# Patient Record
Sex: FEMALE | Race: WHITE | HISPANIC OR LATINO | Employment: PART TIME | URBAN - METROPOLITAN AREA
[De-identification: names, ages, dates, MRNs, and addresses within clinical notes are randomized per-mention and may not be internally consistent; named-entity substitution may affect disease eponyms.]

---

## 2024-08-12 ENCOUNTER — APPOINTMENT (OUTPATIENT)
Dept: GENERAL RADIOLOGY | Facility: CLINIC | Age: 64
DRG: 512 | End: 2024-08-12
Attending: EMERGENCY MEDICINE
Payer: MEDICARE

## 2024-08-12 ENCOUNTER — HOSPITAL ENCOUNTER (INPATIENT)
Facility: CLINIC | Age: 64
LOS: 2 days | Discharge: HOME | DRG: 512 | End: 2024-08-14
Attending: EMERGENCY MEDICINE | Admitting: INTERNAL MEDICINE
Payer: MEDICARE

## 2024-08-12 DIAGNOSIS — S52.531A CLOSED COLLES' FRACTURE OF RIGHT RADIUS, INITIAL ENCOUNTER: ICD-10-CM

## 2024-08-12 LAB
ANION GAP SERPL CALCULATED.3IONS-SCNC: 10 MMOL/L (ref 7–15)
BASOPHILS # BLD AUTO: 0 10E3/UL (ref 0–0.2)
BASOPHILS NFR BLD AUTO: 1 %
BUN SERPL-MCNC: 13.6 MG/DL (ref 8–23)
CALCIUM SERPL-MCNC: 9.1 MG/DL (ref 8.8–10.4)
CHLORIDE SERPL-SCNC: 104 MMOL/L (ref 98–107)
CREAT SERPL-MCNC: 0.7 MG/DL (ref 0.51–0.95)
EGFRCR SERPLBLD CKD-EPI 2021: >90 ML/MIN/1.73M2
EOSINOPHIL # BLD AUTO: 0.1 10E3/UL (ref 0–0.7)
EOSINOPHIL NFR BLD AUTO: 2 %
ERYTHROCYTE [DISTWIDTH] IN BLOOD BY AUTOMATED COUNT: 13.2 % (ref 10–15)
GLUCOSE SERPL-MCNC: 98 MG/DL (ref 70–99)
HCO3 SERPL-SCNC: 25 MMOL/L (ref 22–29)
HCT VFR BLD AUTO: 40.1 % (ref 35–47)
HGB BLD-MCNC: 12.3 G/DL (ref 11.7–15.7)
HOLD SPECIMEN: NORMAL
IMM GRANULOCYTES # BLD: 0.1 10E3/UL
IMM GRANULOCYTES NFR BLD: 1 %
LYMPHOCYTES # BLD AUTO: 1.6 10E3/UL (ref 0.8–5.3)
LYMPHOCYTES NFR BLD AUTO: 27 %
MCH RBC QN AUTO: 26.4 PG (ref 26.5–33)
MCHC RBC AUTO-ENTMCNC: 30.7 G/DL (ref 31.5–36.5)
MCV RBC AUTO: 86 FL (ref 78–100)
MONOCYTES # BLD AUTO: 0.5 10E3/UL (ref 0–1.3)
MONOCYTES NFR BLD AUTO: 8 %
NEUTROPHILS # BLD AUTO: 3.7 10E3/UL (ref 1.6–8.3)
NEUTROPHILS NFR BLD AUTO: 62 %
NRBC # BLD AUTO: 0 10E3/UL
NRBC BLD AUTO-RTO: 0 /100
PLATELET # BLD AUTO: 275 10E3/UL (ref 150–450)
POTASSIUM SERPL-SCNC: 3.7 MMOL/L (ref 3.4–5.3)
RBC # BLD AUTO: 4.66 10E6/UL (ref 3.8–5.2)
SODIUM SERPL-SCNC: 139 MMOL/L (ref 135–145)
WBC # BLD AUTO: 6 10E3/UL (ref 4–11)

## 2024-08-12 PROCEDURE — 250N000009 HC RX 250: Performed by: EMERGENCY MEDICINE

## 2024-08-12 PROCEDURE — 2W3CX1Z IMMOBILIZATION OF RIGHT LOWER ARM USING SPLINT: ICD-10-PCS | Performed by: EMERGENCY MEDICINE

## 2024-08-12 PROCEDURE — 999N000179 XR SURGERY CARM FLUORO LESS THAN 5 MIN W STILLS: Mod: TC

## 2024-08-12 PROCEDURE — 250N000013 HC RX MED GY IP 250 OP 250 PS 637: Performed by: EMERGENCY MEDICINE

## 2024-08-12 PROCEDURE — 73130 X-RAY EXAM OF HAND: CPT | Mod: RT

## 2024-08-12 PROCEDURE — 120N000001 HC R&B MED SURG/OB

## 2024-08-12 PROCEDURE — 82306 VITAMIN D 25 HYDROXY: CPT | Performed by: PHYSICIAN ASSISTANT

## 2024-08-12 PROCEDURE — 85025 COMPLETE CBC W/AUTO DIFF WBC: CPT | Performed by: EMERGENCY MEDICINE

## 2024-08-12 PROCEDURE — 25600 CLTX DST RDL FX/EPHYS SEP WO: CPT | Mod: RT

## 2024-08-12 PROCEDURE — 999N000065 XR WRIST RIGHT G/E 3 VIEWS: Mod: RT

## 2024-08-12 PROCEDURE — 250N000013 HC RX MED GY IP 250 OP 250 PS 637: Performed by: PHYSICIAN ASSISTANT

## 2024-08-12 PROCEDURE — 99222 1ST HOSP IP/OBS MODERATE 55: CPT | Mod: AI | Performed by: PHYSICIAN ASSISTANT

## 2024-08-12 PROCEDURE — 82565 ASSAY OF CREATININE: CPT | Performed by: EMERGENCY MEDICINE

## 2024-08-12 PROCEDURE — 99152 MOD SED SAME PHYS/QHP 5/>YRS: CPT

## 2024-08-12 PROCEDURE — 99285 EMERGENCY DEPT VISIT HI MDM: CPT | Mod: 25

## 2024-08-12 PROCEDURE — 36415 COLL VENOUS BLD VENIPUNCTURE: CPT | Performed by: EMERGENCY MEDICINE

## 2024-08-12 PROCEDURE — 96374 THER/PROPH/DIAG INJ IV PUSH: CPT

## 2024-08-12 PROCEDURE — 73110 X-RAY EXAM OF WRIST: CPT | Mod: RT

## 2024-08-12 PROCEDURE — 96375 TX/PRO/DX INJ NEW DRUG ADDON: CPT

## 2024-08-12 PROCEDURE — 250N000011 HC RX IP 250 OP 636: Performed by: EMERGENCY MEDICINE

## 2024-08-12 PROCEDURE — 0PSHXZZ REPOSITION RIGHT RADIUS, EXTERNAL APPROACH: ICD-10-PCS | Performed by: EMERGENCY MEDICINE

## 2024-08-12 PROCEDURE — 82374 ASSAY BLOOD CARBON DIOXIDE: CPT | Performed by: EMERGENCY MEDICINE

## 2024-08-12 PROCEDURE — 258N000003 HC RX IP 258 OP 636: Performed by: PHYSICIAN ASSISTANT

## 2024-08-12 RX ORDER — LEVOTHYROXINE SODIUM 100 UG/1
TABLET ORAL
COMMUNITY
Start: 2023-12-27

## 2024-08-12 RX ORDER — ACETAMINOPHEN 325 MG/1
325-650 TABLET ORAL EVERY 6 HOURS PRN
Status: ON HOLD | COMMUNITY
End: 2024-08-14

## 2024-08-12 RX ORDER — HYDROMORPHONE HCL IN WATER/PF 6 MG/30 ML
0.4 PATIENT CONTROLLED ANALGESIA SYRINGE INTRAVENOUS
Status: DISCONTINUED | OUTPATIENT
Start: 2024-08-12 | End: 2024-08-14

## 2024-08-12 RX ORDER — LEVOTHYROXINE SODIUM 100 UG/1
100 TABLET ORAL
Status: DISCONTINUED | OUTPATIENT
Start: 2024-08-13 | End: 2024-08-14 | Stop reason: HOSPADM

## 2024-08-12 RX ORDER — AMOXICILLIN 250 MG
2 CAPSULE ORAL 2 TIMES DAILY PRN
Status: DISCONTINUED | OUTPATIENT
Start: 2024-08-12 | End: 2024-08-14 | Stop reason: HOSPADM

## 2024-08-12 RX ORDER — SODIUM CHLORIDE 9 MG/ML
INJECTION, SOLUTION INTRAVENOUS CONTINUOUS
Status: DISCONTINUED | OUTPATIENT
Start: 2024-08-13 | End: 2024-08-14 | Stop reason: HOSPADM

## 2024-08-12 RX ORDER — ACETAMINOPHEN 325 MG/1
650 TABLET ORAL EVERY 4 HOURS PRN
Status: DISCONTINUED | OUTPATIENT
Start: 2024-08-12 | End: 2024-08-14

## 2024-08-12 RX ORDER — HYDROMORPHONE HCL IN WATER/PF 6 MG/30 ML
0.2 PATIENT CONTROLLED ANALGESIA SYRINGE INTRAVENOUS
Status: DISCONTINUED | OUTPATIENT
Start: 2024-08-12 | End: 2024-08-14

## 2024-08-12 RX ORDER — PROPOFOL 10 MG/ML
100 INJECTION, EMULSION INTRAVENOUS ONCE
Status: COMPLETED | OUTPATIENT
Start: 2024-08-12 | End: 2024-08-12

## 2024-08-12 RX ORDER — ONDANSETRON 4 MG/1
4 TABLET, ORALLY DISINTEGRATING ORAL EVERY 6 HOURS PRN
Status: DISCONTINUED | OUTPATIENT
Start: 2024-08-12 | End: 2024-08-14

## 2024-08-12 RX ORDER — OXYCODONE HYDROCHLORIDE 5 MG/1
5 TABLET ORAL EVERY 4 HOURS PRN
Status: DISCONTINUED | OUTPATIENT
Start: 2024-08-12 | End: 2024-08-14

## 2024-08-12 RX ORDER — ONDANSETRON 2 MG/ML
4 INJECTION INTRAMUSCULAR; INTRAVENOUS EVERY 6 HOURS PRN
Status: DISCONTINUED | OUTPATIENT
Start: 2024-08-12 | End: 2024-08-14

## 2024-08-12 RX ORDER — MORPHINE SULFATE 4 MG/ML
4 INJECTION, SOLUTION INTRAMUSCULAR; INTRAVENOUS ONCE
Status: COMPLETED | OUTPATIENT
Start: 2024-08-12 | End: 2024-08-12

## 2024-08-12 RX ORDER — AMOXICILLIN 250 MG
1 CAPSULE ORAL 2 TIMES DAILY PRN
Status: DISCONTINUED | OUTPATIENT
Start: 2024-08-12 | End: 2024-08-14 | Stop reason: HOSPADM

## 2024-08-12 RX ORDER — OXYCODONE HYDROCHLORIDE 5 MG/1
10 TABLET ORAL ONCE
Status: COMPLETED | OUTPATIENT
Start: 2024-08-12 | End: 2024-08-12

## 2024-08-12 RX ORDER — IBUPROFEN 200 MG
200 TABLET ORAL EVERY 4 HOURS PRN
Status: ON HOLD | COMMUNITY
End: 2024-08-14

## 2024-08-12 RX ORDER — LIDOCAINE 40 MG/G
CREAM TOPICAL
Status: DISCONTINUED | OUTPATIENT
Start: 2024-08-12 | End: 2024-08-14 | Stop reason: HOSPADM

## 2024-08-12 RX ADMIN — ACETAMINOPHEN 650 MG: 325 TABLET, FILM COATED ORAL at 21:12

## 2024-08-12 RX ADMIN — Medication 39 MG: at 15:55

## 2024-08-12 RX ADMIN — PROPOFOL 120 MG: 10 INJECTION, EMULSION INTRAVENOUS at 15:58

## 2024-08-12 RX ADMIN — OXYCODONE HYDROCHLORIDE 10 MG: 5 TABLET ORAL at 18:09

## 2024-08-12 RX ADMIN — OXYCODONE HYDROCHLORIDE 5 MG: 5 TABLET ORAL at 23:25

## 2024-08-12 RX ADMIN — SODIUM CHLORIDE: 900 INJECTION INTRAVENOUS at 23:43

## 2024-08-12 ASSESSMENT — ACTIVITIES OF DAILY LIVING (ADL)
ADLS_ACUITY_SCORE: 35

## 2024-08-12 ASSESSMENT — COLUMBIA-SUICIDE SEVERITY RATING SCALE - C-SSRS
2. HAVE YOU ACTUALLY HAD ANY THOUGHTS OF KILLING YOURSELF IN THE PAST MONTH?: NO
6. HAVE YOU EVER DONE ANYTHING, STARTED TO DO ANYTHING, OR PREPARED TO DO ANYTHING TO END YOUR LIFE?: NO
1. IN THE PAST MONTH, HAVE YOU WISHED YOU WERE DEAD OR WISHED YOU COULD GO TO SLEEP AND NOT WAKE UP?: NO

## 2024-08-12 NOTE — H&P
Physician Attestation   I have reviewed and discussed with the advanced practice provider their history, physical and plan for Alyson Lorenz.  I did not participate in a shared visit; this is an advanced practice provider only visit.      Luda Anne MD  Date of Service (when I saw the patient): I did not personally see this patient today.     [facility]  Internal Medicine  History and Physical      Patient Name: Alyson Lorenz MRN# [id number]   Age: 63 year old YOB: 1960     Date of Admission:8/12/2024    Primary care provider: [facility]  Date of Service: 8/12/2024         Assessment and Plan:   Alyson Lorenz is a 63 year old female with a history of Osteopenia and Hypothyroidism who presents to the ED with with right wrist pain.     Patient reports she slipped on a deck, lost her balance and fell backwards on the evening of 8/7/24.  She had immediate right arm pain.  Patient endorses some numbness in her fingers. She was seen at the [facility]. X-rays taken at that time were notable for distal radius fracture per chart review. She was placed in an AlumaFoam volar splint and sling, and advised to follow-up with orthopedics for further evaluation.  She was seen in the [facility] today for follow up where Xrays were repeated and she was instructed to present to the ED to reduce the injury.    Acute Displaced Right Distal Radius Fracture  S/p Mechanical Fall on 8/7/24  - Orthopedic Surgery consulted and plan for operative repain orn 8/13  - npo, IVF, antiemetics, analgesics prn    Hypothyroidism  - continue Levothyroxine    CODE: full  Diet/IVF: regular, npo after midnighg  DVT ppx: scd    Conchis Walters MS PA-C  Physician Assistant   Hospitalist Service           Chief Complaint:   Right Wrist Pain         HPI:   63 year old female with a history of Osteopenia and Hypothyroidism who presents to the ED with with right wrist pain.     Patient  reports she slipped on a deck, lost her balance and fell backwards on the evening of 8/7/24.  She had immediate right arm pain.  She was seen at the [facility]. X-rays taken at that time were notable for distal radius fracture per chart review. She was placed in an AlumaFoam volar splint and sling, and advised to follow-up with orthopedics for further evaluation.  She was seen in the [facility] clinic today for follow up where Xrays were repeated and she was instructed to present to the ED to reduce the injury.         Past Medical History:   Hypothyroidism  Osteopenia  Former Smoker       Past Surgical History:     S/P ORIF (open reduction internal fixation) fracture 04/24/2019     Closed Fuentes's fracture of left radius            Social History:     Social History     Socioeconomic History    Marital status:      Spouse name: Not on file    Number of children: Not on file    Years of education: Not on file    Highest education level: Not on file   Occupational History    Not on file   Tobacco Use    Smoking status: Not on file    Smokeless tobacco: Not on file   Substance and Sexual Activity    Alcohol use: Not on file    Drug use: Not on file    Sexual activity: Not on file   Other Topics Concern    Not on file   Social History Narrative    Not on file     Social Determinants of Health     Financial Resource Strain: Low Risk  (8/12/2024)    [facility]    Financial Resource Strain     Difficulty of Paying Living Expenses: 3     Difficulty of Paying Living Expenses: Not on file   Food Insecurity: No Food Insecurity (8/12/2024)    Received from [facility]    Food Insecurity     Worried About Running Out of Food in the Last Year: 1   Transportation Needs: No Transportation Needs (8/12/2024)    Received from [facility]    Transportation Needs     Lack of Transportation (Medical): 1  "  Physical Activity: Not on file   Stress: Not on file   Social Connections: Socially Integrated (8/12/2024)    [facility]    Social Connections     Frequency of Communication with Friends and Family: 0   Interpersonal Safety: Not on file   Housing Stability: Low Risk  (8/12/2024)    Received from [facility]    Housing Stability     Unable to Pay for Housing in the Last Year: 1          Family History:   No family history on file.       Allergies:      Allergies   Allergen Reactions    Penicillins Anaphylaxis    Aspirin Rash    Sulfa Antibiotics Rash          Medications:     Prior to Admission medications    Medication Sig Last Dose Taking? Auth Provider Long Term End Date   levothyroxine (SYNTHROID/LEVOTHROID) 100 MCG tablet Take 1 Tablet (100 mcg) by mouth before breakfast.  Yes Unknown, Entered By History Yes           Review of Systems:   A complete ROS was performed and is negative other than what is stated in the HPI.       Physical Exam:   Blood pressure (!) 157/94, pulse 68, temperature 98.1  F (36.7  C), temperature source Oral, resp. rate 16, height 1.575 m (5' 2\"), weight 77.3 kg (170 lb 6.7 oz), SpO2 97%.  General: Alert, interactive, NAD  HEENT: AT/NC  Chest/Resp: clear to auscultation bilaterally, no crackles or wheezes  Heart/CV: regular rate and rhythm, no murmur  Abdomen/GI: Soft, nontender, nondistended. +BS.  No rebound or guarding.  Extremities/MSK: No LE edema.  RUE in a splint  Skin: Warm and dry  Neuro: Alert & oriented x 3         Labs:   ROUTINE ICU LABS (Last four results)  CMP  Recent Labs   Lab 08/12/24  1455      POTASSIUM 3.7   CHLORIDE 104   CO2 25   ANIONGAP 10   GLC 98   BUN 13.6   CR 0.70   GFRESTIMATED >90   CHICO 9.1     CBC  Recent Labs   Lab 08/12/24  1456   WBC 6.0   RBC 4.66   HGB 12.3   HCT 40.1   MCV 86   MCH 26.4*   MCHC 30.7*   RDW 13.2             Imaging/Procedures:     Results " for orders placed or performed during the hospital encounter of 08/12/24   XR Hand Right 3 Views    Narrative    XR HAND RIGHT G/E 3 VIEWS, XR WRIST RIGHT G/E 3 VIEWS 8/12/2024 3:11  PM     HISTORY: trauma    COMPARISON: None.       Impression    IMPRESSION:  Acute displaced and foreshortened fracture of the distal radius with  the distal radial fracture fragment displaced dorsally by at least 1.4  cm. Fracture line extends into the distal radial ulnar joint without  widening. Radial carpal alignment appears maintained. Soft tissue  swelling throughout the wrist surrounding the fracture site. Diffuse  osteopenia.    Negrito Lerner MD         SYSTEM ID:  [id number]   XR Wrist Right 3 Views    Narrative    XR HAND RIGHT G/E 3 VIEWS, XR WRIST RIGHT G/E 3 VIEWS 8/12/2024 3:11  PM     HISTORY: trauma    COMPARISON: None.       Impression    IMPRESSION:  Acute displaced and foreshortened fracture of the distal radius with  the distal radial fracture fragment displaced dorsally by at least 1.4  cm. Fracture line extends into the distal radial ulnar joint without  widening. Radial carpal alignment appears maintained. Soft tissue  swelling throughout the wrist surrounding the fracture site. Diffuse  osteopenia.    Negrito Lerner MD         SYSTEM ID:  [id number]   XR Surgery IVETH L/T 5 Min Fluoro w Stills    Narrative    This exam was marked as non-reportable because it will not be read by a   radiologist or a [facility] non-radiologist provider.         XR Wrist Right G/E 3 Views    Narrative    EXAM: XR WRIST RIGHT G/E 3 VIEWS  LOCATION: [facility]  DATE: 8/12/2024    INDICATION: Post reduction.  COMPARISON: 8/12/2024 at 1501 hours.      Impression    IMPRESSION: Acute comminuted moderately displaced intra-articular fracture of the distal radius with moderate improvement in the alignment of the fracture fragments when compared with the prior study.

## 2024-08-12 NOTE — ED TRIAGE NOTES
Patient slipped and fell 5 days ago injuring her right wrist. Patient was seen today by [facility] and was found to have a displaced fracture.       Triage Assessment (Adult)       Row Name 08/12/24 1229          Triage Assessment    Airway WDL WDL        Respiratory WDL    Respiratory WDL WDL        Skin Circulation/Temperature WDL    Skin Circulation/Temperature WDL WDL        Cardiac WDL    Cardiac WDL WDL        Peripheral/Neurovascular WDL    Peripheral Neurovascular WDL WDL        Cognitive/Neuro/Behavioral WDL    Cognitive/Neuro/Behavioral WDL WDL

## 2024-08-12 NOTE — SEDATION DOCUMENTATION
Assisted with reduction of R wrist. Pt given 39 mg IV ketamine and 120 mg of IV propofol. Reduced well. Formal x rays to follow. May need surgery. Pt is A&O, VSS and ABC intact.

## 2024-08-12 NOTE — PHARMACY-ADMISSION MEDICATION HISTORY
Pharmacist Admission Medication History    Admission medication history is complete. The information provided in this note is only as accurate as the sources available at the time of the update.    Information Source(s): Patient and CareEverywhere/SureScripts via in-person    Pertinent Information: -    Changes made to PTA medication list:  Added: all meds  Deleted: None  Changed: None    Allergies reviewed with patient and updates made in EHR: yes    Medication History Completed By: Salazar Garrido RP 8/12/2024 6:42 PM    PTA Med List   Medication Sig Last Dose    acetaminophen (TYLENOL) 325 MG tablet Take 325-650 mg by mouth every 6 hours as needed for pain 8/12/2024 at am    ibuprofen (ADVIL/MOTRIN) 200 MG tablet Take 200 mg by mouth every 4 hours as needed for pain 8/12/2024 at am    levothyroxine (SYNTHROID/LEVOTHROID) 100 MCG tablet Take 1 Tablet (100 mcg) by mouth before breakfast. 8/12/2024 at am

## 2024-08-12 NOTE — ED PROVIDER NOTES
Emergency Department Note      History of Present Illness     Chief Complaint   Wrist Pain    HPI   Alyson Lorenz is a 63 year old female with history of hyperlipidemia who presents to the ED with her family for evaluation of wrist pain. The patient reports that on Wednesday night, she slipped outside on her deck after it had been raining. She put her right arm back to try and stoop the fall, but she injured this wrist. Patient endorses some numbness in her fingers. Denies hitting her head, elbow pain, or other sustained injuries. She states she went to her [facility] because she was told she needed a referral to Orthopedic, but she eventually was able to go to an Urgent Care Ortho. During this appointment, patient got x-rays and was told it needed to be aligned sooner rather than later. Patient has not eaten anything today and her last fluid intake was a few hours ago. Alyson is right handed. She includes that she only takes medications for her hypothyroidism.     Review of External Notes   Clinic notes reviewed from earlier today when the patient was noted to have a distal radius fracture and referred into the ED.    Past Medical History     Medical History and Problem List   Osteopenia  Hyperplastic polyps  Hyperlipidemia  Hypothyroidism   Arthritis     Medications   Synthroid     Surgical History   Cervical conization  Brandial cleft cyst  Bilateral salpingo-oophorectomy   Colonoscopy and polypectomy  Colonoscopy  Appendectomy   Fracture surgery  Hysterectomy  Tonsillectomy and adenoidectomy     Physical Exam     Patient Vitals for the past 24 hrs:   BP Temp Temp src Pulse Resp SpO2 Height Weight   08/12/24 1900 (!) 163/92 -- -- 93 -- 97 % -- --   08/12/24 1845 -- -- -- -- 16 97 % -- --   08/12/24 1830 -- -- -- -- -- 98 % -- --   08/12/24 1815 -- -- -- 72 -- 97 % -- --   08/12/24 1800 (!) 157/94 -- -- 68 16 98 % -- --   08/12/24 1745 -- -- -- 62 -- 98 % -- --   08/12/24 1735 -- -- -- -- -- 98 % -- --  "  08/12/24 1730 (!) 185/85 -- -- 69 -- 98 % -- --   08/12/24 1725 -- -- -- -- 16 99 % -- --   08/12/24 1720 -- -- -- -- -- 99 % -- --   08/12/24 1715 (!) 191/87 -- -- 63 -- 98 % -- --   08/12/24 1710 (!) 189/99 -- -- -- -- -- -- --   08/12/24 1645 -- -- -- 58 14 98 % -- --   08/12/24 1640 (!) 191/88 -- -- 60 12 99 % -- --   08/12/24 1635 -- -- -- 68 16 99 % -- --   08/12/24 1630 (!) 172/98 -- -- 60 15 100 % -- --   08/12/24 1625 (!) 185/91 -- -- 57 12 100 % -- --   08/12/24 1615 (!) 188/92 -- -- 60 14 100 % -- --   08/12/24 1613 (!) 171/93 -- -- 64 23 100 % -- --   08/12/24 1549 (!) 189/98 -- -- 62 18 100 % -- --   08/12/24 1530 -- -- -- -- -- 100 % -- --   08/12/24 1525 -- -- -- -- -- 100 % -- --   08/12/24 1520 (!) 189/88 -- -- 67 16 100 % -- --   08/12/24 1230 (!) 197/107 98.1  F (36.7  C) Oral 65 18 100 % 1.575 m (5' 2\") 77.3 kg (170 lb 6.7 oz)     Physical Exam  Constitutional:       General: She is not in acute distress.     Appearance: Normal appearance. She is not diaphoretic.   HENT:      Head: Atraumatic.      Right Ear: External ear normal.      Left Ear: External ear normal.      Mouth/Throat:      Mouth: Mucous membranes are moist.   Eyes:      General: No scleral icterus.     Conjunctiva/sclera: Conjunctivae normal.   Cardiovascular:      Rate and Rhythm: Normal rate and regular rhythm.      Heart sounds: Normal heart sounds.      Comments: Pulses at the wrist normal.  Pulmonary:      Effort: No respiratory distress.      Breath sounds: Normal breath sounds.   Abdominal:      General: Abdomen is flat.   Musculoskeletal:      Cervical back: Neck supple.      Comments: Compartments in the forearm soft.   Skin:     General: Skin is warm.      Capillary Refill: Capillary refill takes less than 2 seconds.      Findings: No rash.   Neurological:      Mental Status: She is alert and oriented to person, place, and time.      Comments: Range of motion in the fingers on the right hand limited by pain.  " Diminished sensation diffusely over all fingers.           Diagnostics     Lab Results   Labs Ordered and Resulted from Time of ED Arrival to Time of ED Departure   CBC WITH PLATELETS AND DIFFERENTIAL - Abnormal       Result Value    WBC Count 6.0      RBC Count 4.66      Hemoglobin 12.3      Hematocrit 40.1      MCV 86      MCH 26.4 (*)     MCHC 30.7 (*)     RDW 13.2      Platelet Count 275      % Neutrophils 62      % Lymphocytes 27      % Monocytes 8      % Eosinophils 2      % Basophils 1      % Immature Granulocytes 1      NRBCs per 100 WBC 0      Absolute Neutrophils 3.7      Absolute Lymphocytes 1.6      Absolute Monocytes 0.5      Absolute Eosinophils 0.1      Absolute Basophils 0.0      Absolute Immature Granulocytes 0.1      Absolute NRBCs 0.0     BASIC METABOLIC PANEL - Normal    Sodium 139      Potassium 3.7      Chloride 104      Carbon Dioxide (CO2) 25      Anion Gap 10      Urea Nitrogen 13.6      Creatinine 0.70      GFR Estimate >90      Calcium 9.1      Glucose 98       Imaging   XR Wrist Right G/E 3 Views   Final Result   IMPRESSION: Acute comminuted moderately displaced intra-articular fracture of the distal radius with moderate improvement in the alignment of the fracture fragments when compared with the prior study.      XR Surgery IVETH L/T 5 Min Fluoro w Stills   Final Result      XR Wrist Right 3 Views   Final Result   IMPRESSION:   Acute displaced and foreshortened fracture of the distal radius with   the distal radial fracture fragment displaced dorsally by at least 1.4   cm. Fracture line extends into the distal radial ulnar joint without   widening. Radial carpal alignment appears maintained. Soft tissue   swelling throughout the wrist surrounding the fracture site. Diffuse   osteopenia.      Negrito Lerner MD            SYSTEM ID:  [id number]      XR Hand Right 3 Views   Final Result   IMPRESSION:   Acute displaced and foreshortened fracture of the distal radius with   the distal radial  fracture fragment displaced dorsally by at least 1.4   cm. Fracture line extends into the distal radial ulnar joint without   widening. Radial carpal alignment appears maintained. Soft tissue   swelling throughout the wrist surrounding the fracture site. Diffuse   osteopenia.      Negrito Lerner MD            SYSTEM ID:  [id number]        Independent Interpretation   Pre-reduction right wrist and hand XR shows significantly displaced fracture of the right distal radius.  Post-reduction right wrist and hand XR shows improved alignment.    ED Course      Medications Administered   Medications   levothyroxine (SYNTHROID/LEVOTHROID) tablet 100 mcg (has no administration in time range)   lidocaine 1 % 0.1-1 mL (has no administration in time range)   lidocaine (LMX4) cream (has no administration in time range)   sodium chloride (PF) 0.9% PF flush 3 mL (3 mLs Intracatheter Not Given 8/12/24 1852)   sodium chloride (PF) 0.9% PF flush 3 mL (has no administration in time range)   senna-docusate (SENOKOT-S/PERICOLACE) 8.6-50 MG per tablet 1 tablet (has no administration in time range)     Or   senna-docusate (SENOKOT-S/PERICOLACE) 8.6-50 MG per tablet 2 tablet (has no administration in time range)   sodium chloride 0.9 % infusion (has no administration in time range)   acetaminophen (TYLENOL) tablet 650 mg (has no administration in time range)   oxyCODONE IR (ROXICODONE) half-tab 2.5 mg (has no administration in time range)   oxyCODONE (ROXICODONE) tablet 5 mg (has no administration in time range)   HYDROmorphone (DILAUDID) injection 0.2 mg (has no administration in time range)   HYDROmorphone (DILAUDID) injection 0.4 mg (has no administration in time range)   ondansetron (ZOFRAN ODT) ODT tab 4 mg (has no administration in time range)     Or   ondansetron (ZOFRAN) injection 4 mg (has no administration in time range)   morphine (PF) injection 4 mg (4 mg Intravenous Not Given 8/12/24 1500)   ketamine (KETALAR) injection 39 mg (39  mg Intravenous $Given 8/12/24 0453)   propofol (DIPRIVAN) injection 10 mg/mL vial (120 mg Intravenous $Given 8/12/24 2291)   oxyCODONE (ROXICODONE) tablet 10 mg (10 mg Oral $Given 8/12/24 9296)     Procedures   Procedures     Sedation     Procedure: Procedural Sedation    Sedation Level: Moderate    Indication: Fracture reduction    Consent: Written from Patient     Universal protocol: Universal protocol was followed and time out conducted just prior to starting procedure, confirming patient identity, site/side, procedure, patient position, and availability of correct equipment and implants.     Last PO Intake: Emergent procedure    ASA Class: Class 2 - A patient with mild systemic disease     Exam:  Mallampati:  Grade 1 - Soft palate, uvula, and pillars visible    Lungs: Clear   Heart: Regular rate and rhythm     Medication: Ketamine 39 mg, propofol 120 mg      Monitoring:  Monitoring consisted of heart rate, cardiac, continuous pulse oximetry, frequent blood pressure checks.   There was constant attendance by RN until patient recovered and constant attendance by physician until patient stable.   Intubation and emergency airway equipment available.     Response: Vital signs stable, oxygen saturations greater than 92%       Patient Status: Post procedure patient was alert.     Total Physician Drug Administration / Monitoring Time: 12 minutes.     Patient was monitored during recovery and returned to pre-procedure baseline.        Fracture Reduction     Procedure: Fracture Reduction     Indication: Acute displaced and foreshortened fracture    Location: Right radius    Consent: Written from Patient   Risks (including but not limited to: neurologic compromise, vascular injury, pain, and inability to reduce fracture), benefits, and alternatives were discussed with patient and family  and consent for procedure was obtained.     Universal Protocol: Universal protocol was followed and time out conducted just prior to  starting procedure, confirming patient identity, site/side, procedure, patient position, and availability of correct equipment and implants.     Anesthesia/Sedation: Sedation: The patient was sedated, see separate procedure note for details.     Procedure Detail: I manually manipulated and reduced the fracture. C-arm was used.    Immobilized with: Custom splint (See separate procedure note)  Post-reduction x-ray showed adequate reduction/not adequate reduction   Post-procedure distal neurovascular function was intact.     Patient Status:  The patient tolerated the procedure well: Yes. There were no complications.        Splint Placement     Procedure: Splint Placement     Indication: Fracture    Consent: Verbal     Location: Right Wrist    Preparation: Wounds were cleansed and dressed with a non-adherent bandage     Procedure detail:   Splint was applied by Myself  Splint type: Sugar-tong   Splint materilal: Fiberglass  After placement I checked and adjusted the fit as needed to ensure proper positioning/fit   Sensation and circulation are intact after splint placement     Patient Status: The patient tolerated the procedure well: Yes. There were no complications.     Discussion of Management   Hand surgeon, Dr. Ellison.  Admitting hospitalist, Conchis Walters PA-C for Dr. Whaley.    ED Course   ED Course as of 08/12/24 2000   Mon Aug 12, 2024   1439 I obtained history and examined the patient as noted above.    1531 I rechecked and updated the patient.    1546 I rechecked the patient and performed the fracture reduction under sedation as detailed above.   1630 I rechecked and updated the patient.    1732 I consulted with hand surgeon, Dr. Ellison.   1738 I rechecked and updated the patient.    1759 I spoke with admitting hospitalist, Conchis Walters PA-C for Dr. Whaley, regarding admission.     Medical Decision Making / Diagnosis     HERIBERTO Lorenz is a 63 year old female who presents to the emergency department  for evaluation of an injury to the right wrist and forearm that happened 5 days ago.  On arrival she does note numbness in her right hand initially at onset and this is waxed and waned.  This was present on arrival here in the ED.  Her pain was worse today and ultimately she went into another clinic where she was referred here.    The patient gave consent and underwent closed reduction under procedural sedation.  She tolerated this well.  Alignment is markedly improved.  I discussed with on-call hand surgery.  Given that the patient has ongoing complaints of paresthesias she will be admitted for surgical management.  Cap refill is excellent and perfusion of the hand looks good.    Disposition   The patient was admitted to the hospital.     Diagnosis     ICD-10-CM    1. Closed Colles' fracture of right radius, initial encounter  S52.531A            Scribe Disclosure:  MARCOS MORENO, am serving as a scribe at 2:41 PM on 8/12/2024 to document services personally performed by Chino Butler MD based on my observations and the provider's statements to me.      Chino Butler MD  08/12/24 2004

## 2024-08-12 NOTE — ED NOTES
[facility]  ED Nurse Handoff Report    ED Chief complaint: Wrist Pain  . ED Diagnosis:   Final diagnoses:   Closed Colles' fracture of right radius, initial encounter       Allergies:   Allergies   Allergen Reactions    Penicillins Anaphylaxis    Aspirin Rash    Sulfa Antibiotics Rash       Code Status: Full Code    Activity level - Baseline/Home:  independent.  Activity Level - Current:   standby.   Lift room needed: No.   Bariatric: No   Needed: No   Isolation: No.   Infection: Not Applicable.     Respiratory status: Room air    Vital Signs (within 30 minutes):   Vitals:    08/12/24 1730 08/12/24 1735 08/12/24 1745 08/12/24 1800   BP: (!) 185/85      BP Location:       Pulse: 69  62    Resp:       Temp:       TempSrc:       SpO2: 98% 98% 98% 98%   Weight:       Height:           Cardiac Rhythm:  ,      Pain level:    Patient confused: No.   Patient Falls Risk: patient and family education and activity supervised.   Elimination Status: Has voided     Patient Report - Initial Complaint: Patient slipped and fell 5 days ago injuring her right wrist. Patient was seen today by Funmilayo PETERSON and was found to have a displaced fracture.    Focused Assessment:     Musculoskeletal      MusculoskeletalMusculoskeletal WDL: .WDL except; extremity movementExtremity Movement: RUERUE Extremity Movement: active ROM severely impaired        Neuro Cognitive      Neuro CognitiveCognitive/Neuro/Behavioral WDL: WDL          Abnormal Results:   Labs Ordered and Resulted from Time of ED Arrival to Time of ED Departure   CBC WITH PLATELETS AND DIFFERENTIAL - Abnormal       Result Value    WBC Count 6.0      RBC Count 4.66      Hemoglobin 12.3      Hematocrit 40.1      MCV 86      MCH 26.4 (*)     MCHC 30.7 (*)     RDW 13.2      Platelet Count 275      % Neutrophils 62      % Lymphocytes 27      % Monocytes 8      % Eosinophils 2      % Basophils 1      % Immature Granulocytes 1      NRBCs per 100 WBC 0       Absolute Neutrophils 3.7      Absolute Lymphocytes 1.6      Absolute Monocytes 0.5      Absolute Eosinophils 0.1      Absolute Basophils 0.0      Absolute Immature Granulocytes 0.1      Absolute NRBCs 0.0     BASIC METABOLIC PANEL - Normal    Sodium 139      Potassium 3.7      Chloride 104      Carbon Dioxide (CO2) 25      Anion Gap 10      Urea Nitrogen 13.6      Creatinine 0.70      GFR Estimate >90      Calcium 9.1      Glucose 98          XR Wrist Right G/E 3 Views   Final Result   IMPRESSION: Acute comminuted moderately displaced intra-articular fracture of the distal radius with moderate improvement in the alignment of the fracture fragments when compared with the prior study.      XR Surgery IVETH L/T 5 Min Fluoro w Stills   Final Result      XR Wrist Right 3 Views   Final Result   IMPRESSION:   Acute displaced and foreshortened fracture of the distal radius with   the distal radial fracture fragment displaced dorsally by at least 1.4   cm. Fracture line extends into the distal radial ulnar joint without   widening. Radial carpal alignment appears maintained. Soft tissue   swelling throughout the wrist surrounding the fracture site. Diffuse   osteopenia.      Negrito Lerner MD            SYSTEM ID:  [id number]      XR Hand Right 3 Views   Final Result   IMPRESSION:   Acute displaced and foreshortened fracture of the distal radius with   the distal radial fracture fragment displaced dorsally by at least 1.4   cm. Fracture line extends into the distal radial ulnar joint without   widening. Radial carpal alignment appears maintained. Soft tissue   swelling throughout the wrist surrounding the fracture site. Diffuse   osteopenia.      Negrito Lerner MD            SYSTEM ID:  [id number]          Treatments provided: See MAR  Family Comments: Family at bedside  OBS brochure/video discussed/provided to patient:  Yes  ED Medications:   Medications   morphine (PF) injection 4 mg (has no administration in time range)    oxyCODONE (ROXICODONE) tablet 10 mg (has no administration in time range)   ketamine (KETALAR) injection 39 mg (39 mg Intravenous $Given 8/12/24 0123)   propofol (DIPRIVAN) injection 10 mg/mL vial (120 mg Intravenous $Given 8/12/24 7394)       Drips infusing:  No  For the majority of the shift this patient was Green.   Interventions performed were N/A.    Sepsis treatment initiated: No    Cares/treatment/interventions/medications to be completed following ED care: N/A    ED Nurse Name: Katey Rodriguez RN  6:03 PM     RECEIVING UNIT ED HANDOFF REVIEW    Above ED Nurse Handoff Report was reviewed: Yes  Reviewed by: Carolyn Henriquez RN on August 13, 2024 at 12:29 AM   I Amalia called the ED to inform them the note was read: Yes

## 2024-08-13 ENCOUNTER — ANESTHESIA EVENT (OUTPATIENT)
Dept: SURGERY | Facility: CLINIC | Age: 64
DRG: 512 | End: 2024-08-13
Payer: MEDICARE

## 2024-08-13 LAB — VIT D+METAB SERPL-MCNC: 17 NG/ML (ref 20–50)

## 2024-08-13 PROCEDURE — 250N000011 HC RX IP 250 OP 636: Performed by: PHYSICIAN ASSISTANT

## 2024-08-13 PROCEDURE — 120N000001 HC R&B MED SURG/OB

## 2024-08-13 PROCEDURE — 258N000003 HC RX IP 258 OP 636: Performed by: PHYSICIAN ASSISTANT

## 2024-08-13 PROCEDURE — 99232 SBSQ HOSP IP/OBS MODERATE 35: CPT | Performed by: INTERNAL MEDICINE

## 2024-08-13 PROCEDURE — 250N000013 HC RX MED GY IP 250 OP 250 PS 637: Performed by: PHYSICIAN ASSISTANT

## 2024-08-13 PROCEDURE — 250N000013 HC RX MED GY IP 250 OP 250 PS 637: Performed by: INTERNAL MEDICINE

## 2024-08-13 RX ORDER — CLONIDINE HYDROCHLORIDE 0.1 MG/1
0.1 TABLET ORAL 3 TIMES DAILY PRN
Status: DISCONTINUED | OUTPATIENT
Start: 2024-08-13 | End: 2024-08-14 | Stop reason: HOSPADM

## 2024-08-13 RX ORDER — CLONIDINE HYDROCHLORIDE 0.1 MG/1
0.1 TABLET ORAL 3 TIMES DAILY PRN
Status: DISCONTINUED | OUTPATIENT
Start: 2024-08-13 | End: 2024-08-13

## 2024-08-13 RX ORDER — VITAMIN B COMPLEX
50 TABLET ORAL DAILY
Status: DISCONTINUED | OUTPATIENT
Start: 2024-08-14 | End: 2024-08-14 | Stop reason: HOSPADM

## 2024-08-13 RX ADMIN — CLONIDINE HYDROCHLORIDE 0.1 MG: 0.1 TABLET ORAL at 10:43

## 2024-08-13 RX ADMIN — SODIUM CHLORIDE: 900 INJECTION INTRAVENOUS at 22:22

## 2024-08-13 RX ADMIN — ACETAMINOPHEN 650 MG: 325 TABLET, FILM COATED ORAL at 21:25

## 2024-08-13 RX ADMIN — HYDROMORPHONE HYDROCHLORIDE 0.2 MG: 0.2 INJECTION, SOLUTION INTRAMUSCULAR; INTRAVENOUS; SUBCUTANEOUS at 01:05

## 2024-08-13 RX ADMIN — OXYCODONE HYDROCHLORIDE 5 MG: 5 TABLET ORAL at 03:48

## 2024-08-13 RX ADMIN — HYDROMORPHONE HYDROCHLORIDE 0.4 MG: 0.2 INJECTION, SOLUTION INTRAMUSCULAR; INTRAVENOUS; SUBCUTANEOUS at 09:27

## 2024-08-13 RX ADMIN — ACETAMINOPHEN 650 MG: 325 TABLET, FILM COATED ORAL at 05:24

## 2024-08-13 RX ADMIN — OXYCODONE HYDROCHLORIDE 5 MG: 5 TABLET ORAL at 21:25

## 2024-08-13 RX ADMIN — ACETAMINOPHEN 650 MG: 325 TABLET, FILM COATED ORAL at 01:05

## 2024-08-13 RX ADMIN — SODIUM CHLORIDE: 900 INJECTION INTRAVENOUS at 09:25

## 2024-08-13 RX ADMIN — HYDROMORPHONE HYDROCHLORIDE 0.4 MG: 0.2 INJECTION, SOLUTION INTRAMUSCULAR; INTRAVENOUS; SUBCUTANEOUS at 15:55

## 2024-08-13 RX ADMIN — LEVOTHYROXINE SODIUM 100 MCG: 0.1 TABLET ORAL at 06:46

## 2024-08-13 RX ADMIN — HYDROMORPHONE HYDROCHLORIDE 0.2 MG: 0.2 INJECTION, SOLUTION INTRAMUSCULAR; INTRAVENOUS; SUBCUTANEOUS at 06:53

## 2024-08-13 RX ADMIN — SENNOSIDES AND DOCUSATE SODIUM 1 TABLET: 50; 8.6 TABLET ORAL at 13:10

## 2024-08-13 RX ADMIN — OXYCODONE HYDROCHLORIDE 5 MG: 5 TABLET ORAL at 13:10

## 2024-08-13 ASSESSMENT — ACTIVITIES OF DAILY LIVING (ADL)
ADLS_ACUITY_SCORE: 26
ADLS_ACUITY_SCORE: 32
ADLS_ACUITY_SCORE: 26
ADLS_ACUITY_SCORE: 24
ADLS_ACUITY_SCORE: 26
ADLS_ACUITY_SCORE: 26
ADLS_ACUITY_SCORE: 24
ADLS_ACUITY_SCORE: 35
ADLS_ACUITY_SCORE: 24
ADLS_ACUITY_SCORE: 23
ADLS_ACUITY_SCORE: 26
ADLS_ACUITY_SCORE: 23
ADLS_ACUITY_SCORE: 26
ADLS_ACUITY_SCORE: 24
ADLS_ACUITY_SCORE: 26
ADLS_ACUITY_SCORE: 24
ADLS_ACUITY_SCORE: 26
ADLS_ACUITY_SCORE: 24

## 2024-08-13 NOTE — PLAN OF CARE
"Goal Outcome Evaluation:      Plan of Care Reviewed With: patient    Overall Patient Progress: no changeOverall Patient Progress: no change    Outcome Evaluation: Right wrist in splint, surgery scheduled for tomorrow NPO at midnight    Pt is alert and orientated x4, pleasant. Pt up with SBA due to broken wrist. Pt has NS infusing at 100ml/hr.  BP's on higher side, paged MD Putnam, Clonidine given. Pt has some swelling of her fingers on right hand. Pain controlled with Dilaudid, tylenol and oxy.  Pt will need CHG wipes before surgery in am. Will be NPO at midnight. As of now on regular diet.       Problem: Adult Inpatient Plan of Care  Goal: Plan of Care Review  Description: The Plan of Care Review/Shift note should be completed every shift.  The Outcome Evaluation is a brief statement about your assessment that the patient is improving, declining, or no change.  This information will be displayed automatically on your shift  note.  Outcome: Not Progressing  Flowsheets (Taken 8/13/2024 1154)  Outcome Evaluation: Right wrist in splint, surgery scheduled for tomorrow NPO at midnight  Plan of Care Reviewed With: patient  Overall Patient Progress: no change  Goal: Patient-Specific Goal (Individualized)  Description: You can add care plan individualizations to a care plan. Examples of Individualization might be:  \"Parent requests to be called daily at 9am for status\", \"I have a hard time hearing out of my right ear\", or \"Do not touch me to wake me up as it startles  me\".  Outcome: Not Progressing  Goal: Absence of Hospital-Acquired Illness or Injury  Outcome: Not Progressing  Intervention: Identify and Manage Fall Risk  Recent Flowsheet Documentation  Taken 8/13/2024 0902 by Guanaco Guerra, RN  Safety Promotion/Fall Prevention: safety round/check completed  Intervention: Prevent Skin Injury  Recent Flowsheet Documentation  Taken 8/13/2024 0902 by Guanaco Guerra, RN  Body Position: position changed " independently  Intervention: Prevent Infection  Recent Flowsheet Documentation  Taken 8/13/2024 0902 by Guanaco Guerra, RN  Infection Prevention:   single patient room provided   rest/sleep promoted  Goal: Optimal Comfort and Wellbeing  Outcome: Not Progressing  Goal: Readiness for Transition of Care  Outcome: Not Progressing     Problem: Pain Acute  Goal: Optimal Pain Control and Function  Outcome: Not Progressing  Intervention: Optimize Psychosocial Wellbeing  Recent Flowsheet Documentation  Taken 8/13/2024 0902 by Guanaco Guerra, RN  Supportive Measures: active listening utilized     Problem: Fall Injury Risk  Goal: Absence of Fall and Fall-Related Injury  Outcome: Not Progressing  Intervention: Promote Injury-Free Environment  Recent Flowsheet Documentation  Taken 8/13/2024 0902 by Guanaco Guerra, RN  Safety Promotion/Fall Prevention: safety round/check completed     Problem: Comorbidity Management  Goal: Blood Pressure in Desired Range  Outcome: Not Progressing

## 2024-08-13 NOTE — PROGRESS NOTES
Hospitalist Medicine Progress Note   [facility]       Alyson Lorenz is a 63 year old hypothyroidism, osteopenia patient who came to [facility] 8/12/2024 after she fell 5 days before admission on 8/7/2024 injuring her right wrist but subsequently seen in [facility] urgent care and was found to have displaced right distal radial fracture he was evaluated by orthopedic surgery who plans to do operative management 8/14/2024        Date of Admission:  8/12/2024  Assessment & Plan     Acute Right displaced radial fracture  S/p mechanical fall on a slippery surface 8/7/2024  Orthopedic surgery evaluated the patient and plans to do surgery on 8/14/2024  N.p.o. in a.m.  Start IV fluids in a.m.    Hypothyroidism  Continue levothyroxine          Plan:   NPO in am   IV Fluids   Constipation medications     Diet: Regular Diet Adult  NPO per Anesthesia Guidelines for Procedure/Surgery Except for: Meds    DVT Prophylaxis: Pneumatic Compression Devices  Martinez Catheter: Not present  Code Status: Full Code               The patient's care was discussed with the Patient and RN.    Joe Rivera MD  Hospitalist Service  [facility]    ______________________________________________________________________    Interval History     Symptoms   Pain is well controlled in the Right Hand     Review of Systems:   No fever or chills      Data reviewed today: I reviewed all medications, new labs and imaging results over the last 24 hours.     Physical Exam   Vital Signs: Temp: 98.7  F (37.1  C) Temp src: Oral BP: 137/86 Pulse: 83   Resp: 18 SpO2: 96 % O2 Device: None (Room air) Oxygen Delivery: 2 LPM  Weight: 169 lbs 4.8 oz      GENERAL: Patient is not in acute distress  HEENT: EOM+,Conjunctiva is clear   NECK:  no Jugular Venous distention  HEART: S1 S2 regular Rate and Rhythm, there is  no murmur,   LUNGS: Respirations are  not laboured, Lungs are  clear to auscultation without  Crepitations or Wheezing   ABDOMEN: Soft, there is no tenderness, Bowel Sounds are Positive   LOWER LIMBS: no Pedal Edema Bilaterally   CNS:  Alert,  Oriented x 3, Moving all the Four Limbs     Data   Recent Labs   Lab 08/12/24  1456 08/12/24  1455   WBC 6.0  --    HGB 12.3  --    MCV 86  --      --    NA  --  139   POTASSIUM  --  3.7   CHLORIDE  --  104   CO2  --  25   BUN  --  13.6   CR  --  0.70   ANIONGAP  --  10   CHICO  --  9.1   GLC  --  98         Recent Results (from the past 24 hour(s))   XR Surgery IVETH L/T 5 Min Fluoro w Stills    Narrative    This exam was marked as non-reportable because it will not be read by a   radiologist or a [place] non-radiologist provider.         XR Wrist Right G/E 3 Views    Narrative    EXAM: XR WRIST RIGHT G/E 3 VIEWS  LOCATION: [facility]  DATE: 8/12/2024    INDICATION: Post reduction.  COMPARISON: 8/12/2024 at 1501 hours.      Impression    IMPRESSION: Acute comminuted moderately displaced intra-articular fracture of the distal radius with moderate improvement in the alignment of the fracture fragments when compared with the prior study.

## 2024-08-13 NOTE — PLAN OF CARE
"Care from [id number]    Pt is A/O x4, up with SBA. Right wrist is splinted with cast pad and ACE. Some nunbess to RUE otherwise neuros WDL. VSS, /86. Dilaudid IV x1 for pain control. Regular diet, NPO at midnight. Plan for right radial ORIF tomorrow at 0730. NWB to RUE.     Goal Outcome Evaluation:      Plan of Care Reviewed With: patient    Overall Patient Progress: no change  Overall Patient Progress: no change    Outcome Evaluation: Plan for right radial ORIF tomorrow at 0730.      Problem: Adult Inpatient Plan of Care  Goal: Plan of Care Review  Description: The Plan of Care Review/Shift note should be completed every shift.  The Outcome Evaluation is a brief statement about your assessment that the patient is improving, declining, or no change.  This information will be displayed automatically on your shift  note.  Outcome: Progressing  Flowsheets (Taken 8/13/2024 1830)  Outcome Evaluation: Plan for right radial ORIF tomorrow at 0730.  Plan of Care Reviewed With: patient  Overall Patient Progress: no change  Goal: Patient-Specific Goal (Individualized)  Description: You can add care plan individualizations to a care plan. Examples of Individualization might be:  \"Parent requests to be called daily at 9am for status\", \"I have a hard time hearing out of my right ear\", or \"Do not touch me to wake me up as it startles  me\".  Outcome: Progressing  Goal: Absence of Hospital-Acquired Illness or Injury  Outcome: Progressing  Intervention: Identify and Manage Fall Risk  Recent Flowsheet Documentation  Taken 8/13/2024 1600 by Carol Puente, RN  Safety Promotion/Fall Prevention:   activity supervised   assistive device/personal items within reach   clutter free environment maintained   nonskid shoes/slippers when out of bed   patient and family education   room near nurse's station   room organization consistent   safety round/check completed   treat reversible contributory factors   treat underlying cause   " increase visualization of patient  Intervention: Prevent Infection  Recent Flowsheet Documentation  Taken 8/13/2024 1600 by Carol Puente RN  Infection Prevention: single patient room provided  Goal: Optimal Comfort and Wellbeing  Outcome: Progressing  Intervention: Monitor Pain and Promote Comfort  Recent Flowsheet Documentation  Taken 8/13/2024 1555 by Carol Puente RN  Pain Management Interventions: medication (see MAR)  Goal: Readiness for Transition of Care  Outcome: Progressing     Problem: Pain Acute  Goal: Optimal Pain Control and Function  Outcome: Progressing  Intervention: Develop Pain Management Plan  Recent Flowsheet Documentation  Taken 8/13/2024 1555 by Carol Puente RN  Pain Management Interventions: medication (see MAR)  Intervention: Prevent or Manage Pain  Recent Flowsheet Documentation  Taken 8/13/2024 1600 by Carol Puente RN  Medication Review/Management: medications reviewed     Problem: Fall Injury Risk  Goal: Absence of Fall and Fall-Related Injury  Outcome: Progressing  Intervention: Identify and Manage Contributors  Recent Flowsheet Documentation  Taken 8/13/2024 1600 by Carol Puente RN  Medication Review/Management: medications reviewed  Intervention: Promote Injury-Free Environment  Recent Flowsheet Documentation  Taken 8/13/2024 1600 by Carol Puente RN  Safety Promotion/Fall Prevention:   activity supervised   assistive device/personal items within reach   clutter free environment maintained   nonskid shoes/slippers when out of bed   patient and family education   room near nurse's station   room organization consistent   safety round/check completed   treat reversible contributory factors   treat underlying cause   increase visualization of patient     Problem: Comorbidity Management  Goal: Blood Pressure in Desired Range  Outcome: Progressing  Intervention: Maintain Blood Pressure Management  Recent Flowsheet Documentation  Taken 8/13/2024 1600 by Carol Puente  RN  Medication Review/Management: medications reviewed

## 2024-08-13 NOTE — CONSULTS
[facility]    Orthopedic Consultation    Alyson Lorenz MRN# [id number]   Age: 63 year old YOB: 1960     Date of Admission:  8/12/2024    Reason for consult: Right distal radius fracture       Requesting provider: RYAN Anne       Level of consult: Consult, follow and place orders           Assessment and Plan:   Assessment:   Right distal radius fracture      Plan:   Patient scheduled for a right distal radius ORIF tomorrow morning  Surgeon: Dr Ellison  NPO Status effective midnight   NWB right UE, keep splint applied   Vit D ordered   Hold anticoagulants if taken  Pain medication as needed, minimize narcotics as able             Chief Complaint:   Right wrist pain          History of Present Illness:   63 year old female with a history of osteopenia and Hypothyroidism who presents to the ED with with right wrist pain.      Patient reports she slipped on a deck, lost her balance and fell backwards on the evening of 8/7/24.  She had immediate right arm pain.  She was seen at the [facility]. X-rays taken at that time were notable for distal radius fracture per chart review. She was placed in an AlumaFoam volar splint and sling, and advised to follow-up with orthopedics for further evaluation.  She was seen in the [facility] clinic yesterday.  X-rays were repeated showing notable displacement so she was instructed to present to the ED to reduce the fracture. The fracture was reduced and splinted in the ED.  Orthopedics consulted for definitive care.           Past Medical History:   No past medical history on file.          Past Surgical History:   No past surgical history on file.          Social History:     Social History     Tobacco Use    Smoking status: Not on file    Smokeless tobacco: Not on file   Substance Use Topics    Alcohol use: Not on file             Family History:   No family history on file.          Immunizations:     VACCINE/DOSE   Diptheria   DPT    DTAP   HBIG   Hepatitis A   Hepatitis B   HIB   Influenza   Measles   Meningococcal   MMR   Mumps   Pneumococcal   Polio   Rubella   Small Pox   TDAP   Varicella   Zoster             Allergies:     Allergies   Allergen Reactions    Penicillins Anaphylaxis    Aspirin Rash     G6PD deficiency    Sulfa Antibiotics Rash             Medications:     Current Facility-Administered Medications   Medication Dose Route Frequency Provider Last Rate Last Admin    acetaminophen (TYLENOL) tablet 650 mg  650 mg Oral Q4H PRN Conchis Walters PA-C   650 mg at 08/13/24 0524    cloNIDine (CATAPRES) tablet 0.1 mg  0.1 mg Oral TID PRN Joe Rivera MD   0.1 mg at 08/13/24 1043    HYDROmorphone (DILAUDID) injection 0.2 mg  0.2 mg Intravenous Q2H PRN Conchis Walters PA-C   0.2 mg at 08/13/24 0653    HYDROmorphone (DILAUDID) injection 0.4 mg  0.4 mg Intravenous Q2H PRN Conchis Walters PA-C   0.4 mg at 08/13/24 0927    levothyroxine (SYNTHROID/LEVOTHROID) tablet 100 mcg  100 mcg Oral QAM AC Conchis Walters PA-C   100 mcg at 08/13/24 0646    lidocaine (LMX4) cream   Topical Q1H PRN Conchis Walters PA-C        lidocaine 1 % 0.1-1 mL  0.1-1 mL Other Q1H PRN Conchis Walters PA-C        ondansetron (ZOFRAN ODT) ODT tab 4 mg  4 mg Oral Q6H PRN Conchis Walters PA-C        Or    ondansetron (ZOFRAN) injection 4 mg  4 mg Intravenous Q6H PRN Conchis Walters PA-C        oxyCODONE (ROXICODONE) tablet 5 mg  5 mg Oral Q4H PRN Conchis Walters PA-C   5 mg at 08/13/24 0348    oxyCODONE IR (ROXICODONE) half-tab 2.5 mg  2.5 mg Oral Q4H PRN Conchis Walters PA-C        senna-docusate (SENOKOT-S/PERICOLACE) 8.6-50 MG per tablet 1 tablet  1 tablet Oral BID PRN Conchis Walters PA-C        Or    senna-docusate (SENOKOT-S/PERICOLACE) 8.6-50 MG per tablet 2 tablet  2 tablet Oral BID PRN Conchis Walters PA-C        sodium chloride (PF) 0.9% PF flush 3 mL  3 mL Intracatheter Q8H Conchis Walters PA-C        sodium chloride (PF)  0.9% PF flush 3 mL  3 mL Intracatheter q1 min prn Conchis Walters PA-C        sodium chloride 0.9 % infusion   Intravenous Continuous Conchis Walters PA-C 100 mL/hr at 08/13/24 0925 New Bag at 08/13/24 0925             Review of Systems:   ROS:  10 point ROS neg other than the symptoms noted above in the HPI.            Physical Exam:   All vitals have been reviewed  Patient Vitals for the past 24 hrs:   BP Temp Temp src Pulse Resp SpO2 Height Weight   08/13/24 0700 (!) 173/89 97.8  F (36.6  C) Oral 61 -- 95 % -- --   08/13/24 0626 (!) 178/94 -- -- -- -- -- -- --   08/13/24 0348 (!) 177/85 -- -- -- -- -- -- --   08/13/24 0125 (!) 179/92 -- -- -- -- -- -- --   08/13/24 0104 -- -- -- -- -- -- -- 76.8 kg (169 lb 4.8 oz)   08/13/24 0054 (!) 206/94 98.3  F (36.8  C) Oral -- 16 97 % -- --   08/12/24 2111 (!) 156/72 -- -- -- -- -- -- --   08/12/24 1900 (!) 163/92 -- -- 93 -- 97 % -- --   08/12/24 1845 -- -- -- -- 16 97 % -- --   08/12/24 1830 -- -- -- -- -- 98 % -- --   08/12/24 1815 -- -- -- 72 -- 97 % -- --   08/12/24 1800 (!) 157/94 -- -- 68 16 98 % -- --   08/12/24 1745 -- -- -- 62 -- 98 % -- --   08/12/24 1735 -- -- -- -- -- 98 % -- --   08/12/24 1730 (!) 185/85 -- -- 69 -- 98 % -- --   08/12/24 1725 -- -- -- -- 16 99 % -- --   08/12/24 1720 -- -- -- -- -- 99 % -- --   08/12/24 1715 (!) 191/87 -- -- 63 -- 98 % -- --   08/12/24 1710 (!) 189/99 -- -- -- -- -- -- --   08/12/24 1645 -- -- -- 58 14 98 % -- --   08/12/24 1640 (!) 191/88 -- -- 60 12 99 % -- --   08/12/24 1635 -- -- -- 68 16 99 % -- --   08/12/24 1630 (!) 172/98 -- -- 60 15 100 % -- --   08/12/24 1625 (!) 185/91 -- -- 57 12 100 % -- --   08/12/24 1615 (!) 188/92 -- -- 60 14 100 % -- --   08/12/24 1613 (!) 171/93 -- -- 64 23 100 % -- --   08/12/24 1549 (!) 189/98 -- -- 62 18 100 % -- --   08/12/24 1530 -- -- -- -- -- 100 % -- --   08/12/24 1525 -- -- -- -- -- 100 % -- --   08/12/24 1520 (!) 189/88 -- -- 67 16 100 % -- --   08/12/24 1230 (!) 197/107 98.1  " F (36.7  C) Oral 65 18 100 % 1.575 m (5' 2\") 77.3 kg (170 lb 6.7 oz)     No intake or output data in the 24 hours ending 08/13/24 1108      Physical Exam   Temp: 97.8  F (36.6  C) Temp src: Oral BP: (!) 173/89 Pulse: 61   Resp: 16 SpO2: 95 % O2 Device: None (Room air) Oxygen Delivery: 2 LPM  Vital Signs with Ranges  Temp:  [97.8  F (36.6  C)-98.3  F (36.8  C)] 97.8  F (36.6  C)  Pulse:  [57-93] 61  Resp:  [12-23] 16  BP: (156-206)/() 173/89  SpO2:  [95 %-100 %] 95 %  169 lbs 4.8 oz    Constitutional: Pleasant, alert, appropriate, following commands.  HEENT: Head atraumatic normocephalic. Pupils equal round and reactive to light.  Respiratory: Unlabored breathing no audible wheeze  Cardiovascular: Regular rate and rhythm per pulses  GI: Abdomen non-distended.  Lymph/Hematologic: No lymphadenopathy in areas examined  Genitourinary:  No bedoya  Skin: No rashes, no cyanosis, no edema.  Musculoskeletal:   Right UE:   Sugartong splint is in place, clean, dry and intact  Able to flex and extend fingers and thumb  Reports full sensation to light touch  Capillary refill <2 seconds    Neurologic: normal without focal findings, mental status, speech normal, alert and oriented x iii  Neuropsychiatric: stable             Data:   All laboratory data reviewed  Results for orders placed or performed during the hospital encounter of 08/12/24   XR Hand Right 3 Views     Status: None    Narrative    XR HAND RIGHT G/E 3 VIEWS, XR WRIST RIGHT G/E 3 VIEWS 8/12/2024 3:11  PM     HISTORY: trauma    COMPARISON: None.       Impression    IMPRESSION:  Acute displaced and foreshortened fracture of the distal radius with  the distal radial fracture fragment displaced dorsally by at least 1.4  cm. Fracture line extends into the distal radial ulnar joint without  widening. Radial carpal alignment appears maintained. Soft tissue  swelling throughout the wrist surrounding the fracture site. Diffuse  osteopenia.    Negrito Lerner MD       "   SYSTEM ID:  [id number]   XR Wrist Right 3 Views     Status: None    Narrative    XR HAND RIGHT G/E 3 VIEWS, XR WRIST RIGHT G/E 3 VIEWS 8/12/2024 3:11  PM     HISTORY: trauma    COMPARISON: None.       Impression    IMPRESSION:  Acute displaced and foreshortened fracture of the distal radius with  the distal radial fracture fragment displaced dorsally by at least 1.4  cm. Fracture line extends into the distal radial ulnar joint without  widening. Radial carpal alignment appears maintained. Soft tissue  swelling throughout the wrist surrounding the fracture site. Diffuse  osteopenia.    Negrito Lerner MD         SYSTEM ID:  [id number]   XR Surgery IVETH L/T 5 Min Fluoro w Stills     Status: None    Narrative    This exam was marked as non-reportable because it will not be read by a   radiologist or a [place] non-radiologist provider.         XR Wrist Right G/E 3 Views     Status: None    Narrative    EXAM: XR WRIST RIGHT G/E 3 VIEWS  LOCATION: [facility]  DATE: 8/12/2024    INDICATION: Post reduction.  COMPARISON: 8/12/2024 at 1501 hours.      Impression    IMPRESSION: Acute comminuted moderately displaced intra-articular fracture of the distal radius with moderate improvement in the alignment of the fracture fragments when compared with the prior study.   Basic metabolic panel     Status: Normal   Result Value Ref Range    Sodium 139 135 - 145 mmol/L    Potassium 3.7 3.4 - 5.3 mmol/L    Chloride 104 98 - 107 mmol/L    Carbon Dioxide (CO2) 25 22 - 29 mmol/L    Anion Gap 10 7 - 15 mmol/L    Urea Nitrogen 13.6 8.0 - 23.0 mg/dL    Creatinine 0.70 0.51 - 0.95 mg/dL    GFR Estimate >90 >60 mL/min/1.73m2    Calcium 9.1 8.8 - 10.4 mg/dL    Glucose 98 70 - 99 mg/dL   [place] Draw     Status: None    Narrative    The following orders were created for panel order [place] Draw.  Procedure                               Abnormality         Status                     ---------                                -----------         ------                     Extra Red Top Tube[[id number]]                               Final result                 Please view results for these tests on the individual orders.   CBC with platelets and differential     Status: Abnormal   Result Value Ref Range    WBC Count 6.0 4.0 - 11.0 10e3/uL    RBC Count 4.66 3.80 - 5.20 10e6/uL    Hemoglobin 12.3 11.7 - 15.7 g/dL    Hematocrit 40.1 35.0 - 47.0 %    MCV 86 78 - 100 fL    MCH 26.4 (L) 26.5 - 33.0 pg    MCHC 30.7 (L) 31.5 - 36.5 g/dL    RDW 13.2 10.0 - 15.0 %    Platelet Count 275 150 - 450 10e3/uL    % Neutrophils 62 %    % Lymphocytes 27 %    % Monocytes 8 %    % Eosinophils 2 %    % Basophils 1 %    % Immature Granulocytes 1 %    NRBCs per 100 WBC 0 <1 /100    Absolute Neutrophils 3.7 1.6 - 8.3 10e3/uL    Absolute Lymphocytes 1.6 0.8 - 5.3 10e3/uL    Absolute Monocytes 0.5 0.0 - 1.3 10e3/uL    Absolute Eosinophils 0.1 0.0 - 0.7 10e3/uL    Absolute Basophils 0.0 0.0 - 0.2 10e3/uL    Absolute Immature Granulocytes 0.1 <=0.4 10e3/uL    Absolute NRBCs 0.0 10e3/uL   Extra Red Top Tube     Status: None   Result Value Ref Range    Hold Specimen JIC    CBC with platelets differential     Status: Abnormal    Narrative    The following orders were created for panel order CBC with platelets differential.  Procedure                               Abnormality         Status                     ---------                               -----------         ------                     CBC with platelets and d...[[id number]]  Abnormal            Final result                 Please view results for these tests on the individual orders.          Attestation:  I have reviewed today's vital signs, notes, medications, labs and imaging with Dr. Ellison.  Amount of time performed on this consult: 45 minutes.    Nadine Rider PA-C

## 2024-08-13 NOTE — PLAN OF CARE
"CARE FROM [id number]    BP (!) 178/94 (BP Location: Left arm)   Pulse 93   Temp 98.3  F (36.8  C) (Oral)   Resp 16   Ht 1.575 m (5' 2\")   Wt 76.8 kg (169 lb 4.8 oz)   SpO2 97%   BMI 30.97 kg/m        Plan of Care Reviewed With: patient    Overall Patient Progress: no changeOverall Patient Progress: no change    Outcome Evaluation: A&O x4. VSS bedsides hypertensive. RA. Up w/ SBA. R wrist splinted-- trace edema to fingers noted, per pt mild numbness present as well otherwise CMS intact. Mod-to-severe pain managed w/ tyl, oxy, and IV dilaudid-- effective per pt report. Cont NS at 100 ml/hr. NPO for surgery. Bed alarm on, able to call and make needs known.    Problem: Adult Inpatient Plan of Care  Goal: Plan of Care Review  Description: The Plan of Care Review/Shift note should be completed every shift.  The Outcome Evaluation is a brief statement about your assessment that the patient is improving, declining, or no change.  This information will be displayed automatically on your shift  note.  Outcome: Progressing  Flowsheets (Taken 8/13/2024 0157)  Outcome Evaluation: A&O x4. VSS bedsides hypertensive. RA. Up w/ SBA. R wrist splinted-- trace edema to fingers noted, per pt mild numbness present as well otherwise CMS intact. Mod-to-severe pain managed w/ tyl, oxy, and IV dilaudid-- effective per pt report. Cont NS at 100 ml/hr. NPO for surgery. Bed alarm on, able to call and make needs known.  Plan of Care Reviewed With: patient  Overall Patient Progress: no change  Goal: Patient-Specific Goal (Individualized)  Description: You can add care plan individualizations to a care plan. Examples of Individualization might be:  \"Parent requests to be called daily at 9am for status\", \"I have a hard time hearing out of my right ear\", or \"Do not touch me to wake me up as it startles  me\".  Outcome: Progressing  Goal: Absence of Hospital-Acquired Illness or Injury  Outcome: Progressing  Intervention: Identify and Manage Fall " Risk  Recent Flowsheet Documentation  Taken 8/13/2024 0122 by Carolyn Mcgowan, RN  Safety Promotion/Fall Prevention:   activity supervised   clutter free environment maintained   increase visualization of patient   nonskid shoes/slippers when out of bed   patient and family education   treat reversible contributory factors   treat underlying cause   safety round/check completed  Intervention: Prevent Infection  Recent Flowsheet Documentation  Taken 8/13/2024 0122 by Carolyn Mcgowan, RN  Infection Prevention:   single patient room provided   rest/sleep promoted  Goal: Optimal Comfort and Wellbeing  Outcome: Progressing  Goal: Readiness for Transition of Care  Outcome: Progressing  Intervention: Mutually Develop Transition Plan  Recent Flowsheet Documentation  Taken 8/13/2024 0115 by Carolyn Mcgowan, RN  Equipment Currently Used at Home: none

## 2024-08-13 NOTE — PLAN OF CARE
Full consult to follow.   No OR availability today, surgery will be scheduled for tomorrow.   OK to eat today.    Markel Ellison MD   [facility]   Orthopaedic Surgery - Hand & Upper Extremity

## 2024-08-14 ENCOUNTER — APPOINTMENT (OUTPATIENT)
Dept: GENERAL RADIOLOGY | Facility: CLINIC | Age: 64
DRG: 512 | End: 2024-08-14
Attending: STUDENT IN AN ORGANIZED HEALTH CARE EDUCATION/TRAINING PROGRAM
Payer: MEDICARE

## 2024-08-14 ENCOUNTER — ANESTHESIA (OUTPATIENT)
Dept: SURGERY | Facility: CLINIC | Age: 64
DRG: 512 | End: 2024-08-14
Payer: MEDICARE

## 2024-08-14 VITALS
SYSTOLIC BLOOD PRESSURE: 152 MMHG | OXYGEN SATURATION: 94 % | DIASTOLIC BLOOD PRESSURE: 92 MMHG | HEIGHT: 62 IN | RESPIRATION RATE: 18 BRPM | WEIGHT: 169.3 LBS | TEMPERATURE: 98.7 F | HEART RATE: 83 BPM | BODY MASS INDEX: 31.15 KG/M2

## 2024-08-14 PROCEDURE — 360N000077 HC SURGERY LEVEL 4, PER MIN: Performed by: STUDENT IN AN ORGANIZED HEALTH CARE EDUCATION/TRAINING PROGRAM

## 2024-08-14 PROCEDURE — 250N000011 HC RX IP 250 OP 636: Performed by: ANESTHESIOLOGY

## 2024-08-14 PROCEDURE — 999N000179 XR SURGERY CARM FLUORO LESS THAN 5 MIN W STILLS: Mod: TC

## 2024-08-14 PROCEDURE — 258N000003 HC RX IP 258 OP 636

## 2024-08-14 PROCEDURE — 250N000011 HC RX IP 250 OP 636: Performed by: NURSE ANESTHETIST, CERTIFIED REGISTERED

## 2024-08-14 PROCEDURE — 272N000001 HC OR GENERAL SUPPLY STERILE: Performed by: STUDENT IN AN ORGANIZED HEALTH CARE EDUCATION/TRAINING PROGRAM

## 2024-08-14 PROCEDURE — 250N000025 HC SEVOFLURANE, PER MIN: Performed by: STUDENT IN AN ORGANIZED HEALTH CARE EDUCATION/TRAINING PROGRAM

## 2024-08-14 PROCEDURE — 370N000017 HC ANESTHESIA TECHNICAL FEE, PER MIN: Performed by: STUDENT IN AN ORGANIZED HEALTH CARE EDUCATION/TRAINING PROGRAM

## 2024-08-14 PROCEDURE — 258N000003 HC RX IP 258 OP 636: Performed by: ANESTHESIOLOGY

## 2024-08-14 PROCEDURE — 99238 HOSP IP/OBS DSCHRG MGMT 30/<: CPT | Performed by: INTERNAL MEDICINE

## 2024-08-14 PROCEDURE — 250N000011 HC RX IP 250 OP 636: Performed by: STUDENT IN AN ORGANIZED HEALTH CARE EDUCATION/TRAINING PROGRAM

## 2024-08-14 PROCEDURE — 250N000013 HC RX MED GY IP 250 OP 250 PS 637: Performed by: PHYSICIAN ASSISTANT

## 2024-08-14 PROCEDURE — 250N000013 HC RX MED GY IP 250 OP 250 PS 637

## 2024-08-14 PROCEDURE — 250N000009 HC RX 250: Performed by: STUDENT IN AN ORGANIZED HEALTH CARE EDUCATION/TRAINING PROGRAM

## 2024-08-14 PROCEDURE — 0PSH04Z REPOSITION RIGHT RADIUS WITH INTERNAL FIXATION DEVICE, OPEN APPROACH: ICD-10-PCS | Performed by: STUDENT IN AN ORGANIZED HEALTH CARE EDUCATION/TRAINING PROGRAM

## 2024-08-14 PROCEDURE — 710N000009 HC RECOVERY PHASE 1, LEVEL 1, PER MIN: Performed by: STUDENT IN AN ORGANIZED HEALTH CARE EDUCATION/TRAINING PROGRAM

## 2024-08-14 PROCEDURE — C1713 ANCHOR/SCREW BN/BN,TIS/BN: HCPCS | Performed by: STUDENT IN AN ORGANIZED HEALTH CARE EDUCATION/TRAINING PROGRAM

## 2024-08-14 PROCEDURE — 250N000013 HC RX MED GY IP 250 OP 250 PS 637: Performed by: ANESTHESIOLOGY

## 2024-08-14 PROCEDURE — 250N000013 HC RX MED GY IP 250 OP 250 PS 637: Performed by: INTERNAL MEDICINE

## 2024-08-14 PROCEDURE — 250N000009 HC RX 250: Performed by: ANESTHESIOLOGY

## 2024-08-14 PROCEDURE — 250N000009 HC RX 250: Performed by: NURSE ANESTHETIST, CERTIFIED REGISTERED

## 2024-08-14 PROCEDURE — 999N000141 HC STATISTIC PRE-PROCEDURE NURSING ASSESSMENT: Performed by: STUDENT IN AN ORGANIZED HEALTH CARE EDUCATION/TRAINING PROGRAM

## 2024-08-14 DEVICE — IMPLANTABLE DEVICE: Type: IMPLANTABLE DEVICE | Site: WRIST | Status: FUNCTIONAL

## 2024-08-14 DEVICE — IMP WIRE KIRSCHNER 1.6MM KW062SS: Type: IMPLANTABLE DEVICE | Site: WRIST | Status: FUNCTIONAL

## 2024-08-14 RX ORDER — CLINDAMYCIN PHOSPHATE 900 MG/50ML
900 INJECTION, SOLUTION INTRAVENOUS
Status: DISCONTINUED | OUTPATIENT
Start: 2024-08-14 | End: 2024-08-14 | Stop reason: HOSPADM

## 2024-08-14 RX ORDER — OXYCODONE HYDROCHLORIDE 5 MG/1
5 TABLET ORAL
Status: COMPLETED | OUTPATIENT
Start: 2024-08-14 | End: 2024-08-14

## 2024-08-14 RX ORDER — SODIUM CHLORIDE, SODIUM LACTATE, POTASSIUM CHLORIDE, CALCIUM CHLORIDE 600; 310; 30; 20 MG/100ML; MG/100ML; MG/100ML; MG/100ML
INJECTION, SOLUTION INTRAVENOUS CONTINUOUS
Status: DISCONTINUED | OUTPATIENT
Start: 2024-08-14 | End: 2024-08-14 | Stop reason: HOSPADM

## 2024-08-14 RX ORDER — ONDANSETRON 4 MG/1
4 TABLET, ORALLY DISINTEGRATING ORAL EVERY 30 MIN PRN
Status: DISCONTINUED | OUTPATIENT
Start: 2024-08-14 | End: 2024-08-14 | Stop reason: HOSPADM

## 2024-08-14 RX ORDER — DEXAMETHASONE SODIUM PHOSPHATE 4 MG/ML
4 INJECTION, SOLUTION INTRA-ARTICULAR; INTRALESIONAL; INTRAMUSCULAR; INTRAVENOUS; SOFT TISSUE
Status: DISCONTINUED | OUTPATIENT
Start: 2024-08-14 | End: 2024-08-14 | Stop reason: HOSPADM

## 2024-08-14 RX ORDER — ONDANSETRON 4 MG/1
4 TABLET, ORALLY DISINTEGRATING ORAL EVERY 6 HOURS PRN
Status: DISCONTINUED | OUTPATIENT
Start: 2024-08-14 | End: 2024-08-14 | Stop reason: HOSPADM

## 2024-08-14 RX ORDER — OXYCODONE HYDROCHLORIDE 5 MG/1
5-10 TABLET ORAL EVERY 4 HOURS PRN
Qty: 20 TABLET | Refills: 0 | Status: SHIPPED | OUTPATIENT
Start: 2024-08-14 | End: 2024-08-17

## 2024-08-14 RX ORDER — NALOXONE HYDROCHLORIDE 0.4 MG/ML
0.1 INJECTION, SOLUTION INTRAMUSCULAR; INTRAVENOUS; SUBCUTANEOUS
Status: DISCONTINUED | OUTPATIENT
Start: 2024-08-14 | End: 2024-08-14 | Stop reason: HOSPADM

## 2024-08-14 RX ORDER — ACETAMINOPHEN 325 MG/1
975 TABLET ORAL
Status: DISCONTINUED | OUTPATIENT
Start: 2024-08-14 | End: 2024-08-14 | Stop reason: HOSPADM

## 2024-08-14 RX ORDER — OXYCODONE HYDROCHLORIDE 10 MG/1
10 TABLET ORAL EVERY 4 HOURS PRN
Status: DISCONTINUED | OUTPATIENT
Start: 2024-08-14 | End: 2024-08-14

## 2024-08-14 RX ORDER — ACETAMINOPHEN 325 MG/1
975 TABLET ORAL EVERY 8 HOURS PRN
Qty: 50 TABLET | Refills: 1 | Status: SHIPPED | OUTPATIENT
Start: 2024-08-14

## 2024-08-14 RX ORDER — DEXAMETHASONE SODIUM PHOSPHATE 4 MG/ML
INJECTION, SOLUTION INTRA-ARTICULAR; INTRALESIONAL; INTRAMUSCULAR; INTRAVENOUS; SOFT TISSUE PRN
Status: DISCONTINUED | OUTPATIENT
Start: 2024-08-14 | End: 2024-08-14

## 2024-08-14 RX ORDER — TRANEXAMIC ACID 10 MG/ML
1 INJECTION, SOLUTION INTRAVENOUS ONCE
Status: COMPLETED | OUTPATIENT
Start: 2024-08-14 | End: 2024-08-14

## 2024-08-14 RX ORDER — ACETAMINOPHEN 325 MG/1
650 TABLET ORAL EVERY 4 HOURS PRN
Status: DISCONTINUED | OUTPATIENT
Start: 2024-08-17 | End: 2024-08-14 | Stop reason: HOSPADM

## 2024-08-14 RX ORDER — AMOXICILLIN 250 MG
1-2 CAPSULE ORAL 2 TIMES DAILY
Qty: 30 TABLET | Refills: 0 | Status: SHIPPED | OUTPATIENT
Start: 2024-08-14

## 2024-08-14 RX ORDER — ONDANSETRON 2 MG/ML
4 INJECTION INTRAMUSCULAR; INTRAVENOUS EVERY 6 HOURS PRN
Status: DISCONTINUED | OUTPATIENT
Start: 2024-08-14 | End: 2024-08-14 | Stop reason: HOSPADM

## 2024-08-14 RX ORDER — HYDROMORPHONE HCL IN WATER/PF 6 MG/30 ML
0.2 PATIENT CONTROLLED ANALGESIA SYRINGE INTRAVENOUS
Status: DISCONTINUED | OUTPATIENT
Start: 2024-08-14 | End: 2024-08-14 | Stop reason: HOSPADM

## 2024-08-14 RX ORDER — LABETALOL HYDROCHLORIDE 5 MG/ML
10 INJECTION, SOLUTION INTRAVENOUS
Status: DISCONTINUED | OUTPATIENT
Start: 2024-08-14 | End: 2024-08-14 | Stop reason: HOSPADM

## 2024-08-14 RX ORDER — LIDOCAINE HYDROCHLORIDE AND EPINEPHRINE 10; 10 MG/ML; UG/ML
INJECTION, SOLUTION INFILTRATION; PERINEURAL PRN
Status: DISCONTINUED | OUTPATIENT
Start: 2024-08-14 | End: 2024-08-14 | Stop reason: HOSPADM

## 2024-08-14 RX ORDER — FENTANYL CITRATE 50 UG/ML
25 INJECTION, SOLUTION INTRAMUSCULAR; INTRAVENOUS EVERY 5 MIN PRN
Status: DISCONTINUED | OUTPATIENT
Start: 2024-08-14 | End: 2024-08-14 | Stop reason: HOSPADM

## 2024-08-14 RX ORDER — MAGNESIUM HYDROXIDE 1200 MG/15ML
LIQUID ORAL PRN
Status: DISCONTINUED | OUTPATIENT
Start: 2024-08-14 | End: 2024-08-14 | Stop reason: HOSPADM

## 2024-08-14 RX ORDER — AMOXICILLIN 250 MG
1 CAPSULE ORAL 2 TIMES DAILY
Status: DISCONTINUED | OUTPATIENT
Start: 2024-08-14 | End: 2024-08-14 | Stop reason: HOSPADM

## 2024-08-14 RX ORDER — PROPOFOL 10 MG/ML
INJECTION, EMULSION INTRAVENOUS PRN
Status: DISCONTINUED | OUTPATIENT
Start: 2024-08-14 | End: 2024-08-14

## 2024-08-14 RX ORDER — LIDOCAINE 40 MG/G
CREAM TOPICAL
Status: DISCONTINUED | OUTPATIENT
Start: 2024-08-14 | End: 2024-08-14 | Stop reason: HOSPADM

## 2024-08-14 RX ORDER — HYDROMORPHONE HCL IN WATER/PF 6 MG/30 ML
0.4 PATIENT CONTROLLED ANALGESIA SYRINGE INTRAVENOUS
Status: DISCONTINUED | OUTPATIENT
Start: 2024-08-14 | End: 2024-08-14 | Stop reason: HOSPADM

## 2024-08-14 RX ORDER — BUPIVACAINE HYDROCHLORIDE AND EPINEPHRINE 5; 5 MG/ML; UG/ML
INJECTION, SOLUTION PERINEURAL
Status: COMPLETED | OUTPATIENT
Start: 2024-08-14 | End: 2024-08-14

## 2024-08-14 RX ORDER — ACETAMINOPHEN 325 MG/1
975 TABLET ORAL EVERY 8 HOURS
Status: DISCONTINUED | OUTPATIENT
Start: 2024-08-14 | End: 2024-08-14 | Stop reason: HOSPADM

## 2024-08-14 RX ORDER — PROCHLORPERAZINE MALEATE 10 MG
10 TABLET ORAL EVERY 6 HOURS PRN
Status: DISCONTINUED | OUTPATIENT
Start: 2024-08-14 | End: 2024-08-14 | Stop reason: HOSPADM

## 2024-08-14 RX ORDER — IBUPROFEN 600 MG/1
600 TABLET, FILM COATED ORAL EVERY 6 HOURS PRN
Qty: 40 TABLET | Refills: 1 | Status: SHIPPED | OUTPATIENT
Start: 2024-08-14

## 2024-08-14 RX ORDER — ONDANSETRON 2 MG/ML
4 INJECTION INTRAMUSCULAR; INTRAVENOUS EVERY 30 MIN PRN
Status: DISCONTINUED | OUTPATIENT
Start: 2024-08-14 | End: 2024-08-14 | Stop reason: HOSPADM

## 2024-08-14 RX ORDER — LIDOCAINE HYDROCHLORIDE 20 MG/ML
INJECTION, SOLUTION INFILTRATION; PERINEURAL PRN
Status: DISCONTINUED | OUTPATIENT
Start: 2024-08-14 | End: 2024-08-14

## 2024-08-14 RX ORDER — NALOXONE HYDROCHLORIDE 0.4 MG/ML
0.4 INJECTION, SOLUTION INTRAMUSCULAR; INTRAVENOUS; SUBCUTANEOUS
Status: DISCONTINUED | OUTPATIENT
Start: 2024-08-14 | End: 2024-08-14 | Stop reason: HOSPADM

## 2024-08-14 RX ORDER — ACETAMINOPHEN 500 MG
1000 TABLET ORAL EVERY 8 HOURS
Qty: 50 TABLET | Refills: 1 | Status: SHIPPED | OUTPATIENT
Start: 2024-08-14

## 2024-08-14 RX ORDER — NALOXONE HYDROCHLORIDE 0.4 MG/ML
0.2 INJECTION, SOLUTION INTRAMUSCULAR; INTRAVENOUS; SUBCUTANEOUS
Status: DISCONTINUED | OUTPATIENT
Start: 2024-08-14 | End: 2024-08-14 | Stop reason: HOSPADM

## 2024-08-14 RX ORDER — HYDROMORPHONE HCL IN WATER/PF 6 MG/30 ML
0.4 PATIENT CONTROLLED ANALGESIA SYRINGE INTRAVENOUS EVERY 5 MIN PRN
Status: DISCONTINUED | OUTPATIENT
Start: 2024-08-14 | End: 2024-08-14 | Stop reason: HOSPADM

## 2024-08-14 RX ORDER — FENTANYL CITRATE 50 UG/ML
INJECTION, SOLUTION INTRAMUSCULAR; INTRAVENOUS PRN
Status: DISCONTINUED | OUTPATIENT
Start: 2024-08-14 | End: 2024-08-14

## 2024-08-14 RX ORDER — IBUPROFEN 200 MG
600 TABLET ORAL EVERY 6 HOURS PRN
Qty: 50 TABLET | Refills: 1 | Status: SHIPPED | OUTPATIENT
Start: 2024-08-14 | End: 2024-08-14

## 2024-08-14 RX ORDER — ONDANSETRON 4 MG/1
4 TABLET, FILM COATED ORAL EVERY 8 HOURS PRN
Qty: 5 TABLET | Refills: 1 | Status: SHIPPED | OUTPATIENT
Start: 2024-08-14

## 2024-08-14 RX ORDER — BISACODYL 10 MG
10 SUPPOSITORY, RECTAL RECTAL DAILY PRN
Status: DISCONTINUED | OUTPATIENT
Start: 2024-08-17 | End: 2024-08-14 | Stop reason: HOSPADM

## 2024-08-14 RX ORDER — TRANEXAMIC ACID 10 MG/ML
1 INJECTION, SOLUTION INTRAVENOUS ONCE
Status: DISCONTINUED | OUTPATIENT
Start: 2024-08-14 | End: 2024-08-14 | Stop reason: HOSPADM

## 2024-08-14 RX ORDER — CLINDAMYCIN PHOSPHATE 900 MG/50ML
900 INJECTION, SOLUTION INTRAVENOUS SEE ADMIN INSTRUCTIONS
Status: DISCONTINUED | OUTPATIENT
Start: 2024-08-14 | End: 2024-08-14 | Stop reason: HOSPADM

## 2024-08-14 RX ORDER — POLYETHYLENE GLYCOL 3350 17 G/17G
17 POWDER, FOR SOLUTION ORAL DAILY
Status: DISCONTINUED | OUTPATIENT
Start: 2024-08-15 | End: 2024-08-14 | Stop reason: HOSPADM

## 2024-08-14 RX ORDER — ONDANSETRON 2 MG/ML
INJECTION INTRAMUSCULAR; INTRAVENOUS PRN
Status: DISCONTINUED | OUTPATIENT
Start: 2024-08-14 | End: 2024-08-14

## 2024-08-14 RX ORDER — VITAMIN B COMPLEX
50 TABLET ORAL DAILY
Qty: 60 TABLET | Refills: 0 | Status: SHIPPED | OUTPATIENT
Start: 2024-08-14 | End: 2024-09-13

## 2024-08-14 RX ORDER — FENTANYL CITRATE 50 UG/ML
50 INJECTION, SOLUTION INTRAMUSCULAR; INTRAVENOUS EVERY 5 MIN PRN
Status: DISCONTINUED | OUTPATIENT
Start: 2024-08-14 | End: 2024-08-14 | Stop reason: HOSPADM

## 2024-08-14 RX ORDER — HYDROMORPHONE HCL IN WATER/PF 6 MG/30 ML
0.2 PATIENT CONTROLLED ANALGESIA SYRINGE INTRAVENOUS EVERY 5 MIN PRN
Status: DISCONTINUED | OUTPATIENT
Start: 2024-08-14 | End: 2024-08-14 | Stop reason: HOSPADM

## 2024-08-14 RX ORDER — ACETAMINOPHEN 325 MG/1
650 TABLET ORAL EVERY 4 HOURS PRN
Qty: 100 TABLET | Refills: 0 | Status: SHIPPED | OUTPATIENT
Start: 2024-08-14

## 2024-08-14 RX ORDER — OXYCODONE HYDROCHLORIDE 10 MG/1
10 TABLET ORAL EVERY 4 HOURS PRN
Status: DISCONTINUED | OUTPATIENT
Start: 2024-08-14 | End: 2024-08-14 | Stop reason: HOSPADM

## 2024-08-14 RX ADMIN — ACETAMINOPHEN 650 MG: 325 TABLET, FILM COATED ORAL at 02:17

## 2024-08-14 RX ADMIN — FENTANYL CITRATE 25 MCG: 0.05 INJECTION, SOLUTION INTRAMUSCULAR; INTRAVENOUS at 09:43

## 2024-08-14 RX ADMIN — SODIUM CHLORIDE, POTASSIUM CHLORIDE, SODIUM LACTATE AND CALCIUM CHLORIDE: 600; 310; 30; 20 INJECTION, SOLUTION INTRAVENOUS at 08:51

## 2024-08-14 RX ADMIN — MIDAZOLAM 2 MG: 1 INJECTION INTRAMUSCULAR; INTRAVENOUS at 07:15

## 2024-08-14 RX ADMIN — FENTANYL CITRATE 25 MCG: 0.05 INJECTION, SOLUTION INTRAMUSCULAR; INTRAVENOUS at 09:14

## 2024-08-14 RX ADMIN — OXYCODONE HYDROCHLORIDE 10 MG: 10 TABLET ORAL at 16:15

## 2024-08-14 RX ADMIN — SENNOSIDES AND DOCUSATE SODIUM 1 TABLET: 50; 8.6 TABLET ORAL at 11:36

## 2024-08-14 RX ADMIN — LIDOCAINE HYDROCHLORIDE 50 MG: 20 INJECTION, SOLUTION INFILTRATION; PERINEURAL at 07:31

## 2024-08-14 RX ADMIN — ACETAMINOPHEN 975 MG: 325 TABLET, FILM COATED ORAL at 11:36

## 2024-08-14 RX ADMIN — DEXAMETHASONE SODIUM PHOSPHATE 8 MG: 4 INJECTION, SOLUTION INTRA-ARTICULAR; INTRALESIONAL; INTRAMUSCULAR; INTRAVENOUS; SOFT TISSUE at 07:31

## 2024-08-14 RX ADMIN — FENTANYL CITRATE 50 MCG: 0.05 INJECTION, SOLUTION INTRAMUSCULAR; INTRAVENOUS at 09:20

## 2024-08-14 RX ADMIN — SODIUM CHLORIDE, POTASSIUM CHLORIDE, SODIUM LACTATE AND CALCIUM CHLORIDE: 600; 310; 30; 20 INJECTION, SOLUTION INTRAVENOUS at 11:10

## 2024-08-14 RX ADMIN — BUPIVACAINE HYDROCHLORIDE AND EPINEPHRINE BITARTRATE 25 ML: 5; .005 INJECTION, SOLUTION PERINEURAL at 07:15

## 2024-08-14 RX ADMIN — FENTANYL CITRATE 50 MCG: 50 INJECTION INTRAMUSCULAR; INTRAVENOUS at 07:50

## 2024-08-14 RX ADMIN — FENTANYL CITRATE 50 MCG: 50 INJECTION INTRAMUSCULAR; INTRAVENOUS at 07:31

## 2024-08-14 RX ADMIN — TRANEXAMIC ACID 1 G: 10 INJECTION, SOLUTION INTRAVENOUS at 07:36

## 2024-08-14 RX ADMIN — CLINDAMYCIN PHOSPHATE 900 MG: 900 INJECTION, SOLUTION INTRAVENOUS at 06:27

## 2024-08-14 RX ADMIN — SODIUM CHLORIDE, POTASSIUM CHLORIDE, SODIUM LACTATE AND CALCIUM CHLORIDE: 600; 310; 30; 20 INJECTION, SOLUTION INTRAVENOUS at 06:17

## 2024-08-14 RX ADMIN — PROPOFOL 170 MG: 10 INJECTION, EMULSION INTRAVENOUS at 07:31

## 2024-08-14 RX ADMIN — OXYCODONE HYDROCHLORIDE 5 MG: 5 TABLET ORAL at 09:57

## 2024-08-14 RX ADMIN — LEVOTHYROXINE SODIUM 100 MCG: 0.1 TABLET ORAL at 11:49

## 2024-08-14 RX ADMIN — ONDANSETRON 4 MG: 2 INJECTION INTRAMUSCULAR; INTRAVENOUS at 08:45

## 2024-08-14 ASSESSMENT — ENCOUNTER SYMPTOMS: DYSRHYTHMIAS: 0

## 2024-08-14 ASSESSMENT — ACTIVITIES OF DAILY LIVING (ADL)
ADLS_ACUITY_SCORE: 25
ADLS_ACUITY_SCORE: 26
ADLS_ACUITY_SCORE: 26
ADLS_ACUITY_SCORE: 25
ADLS_ACUITY_SCORE: 26
ADLS_ACUITY_SCORE: 25
ADLS_ACUITY_SCORE: 26
ADLS_ACUITY_SCORE: 25
ADLS_ACUITY_SCORE: 26
ADLS_ACUITY_SCORE: 32
ADLS_ACUITY_SCORE: 32
ADLS_ACUITY_SCORE: 26

## 2024-08-14 ASSESSMENT — COPD QUESTIONNAIRES: COPD: 0

## 2024-08-14 ASSESSMENT — LIFESTYLE VARIABLES: TOBACCO_USE: 1

## 2024-08-14 NOTE — PLAN OF CARE
"Pt arrived to the floor @1100 from surgery. Pt a/o x4. C/o R wrist pain, oxycodone and tylenol given with relief. Offered ice pack, pt refused. R arm in sling. Dressing clean dry and intact. Pt c/o of numbness and tingling still. Cap refill less than 3 seconds, had warm, unable to assess radial pulse in R wrist. Voided multiple times. IV removed. Went over discharge instructions with pt. Pt verbalized understanding. All questions answered. All discharge medications sent home with pt. All belongings sent home with pt. Pt discharged home with son and .     BP (!) 152/92 (BP Location: Left arm, Patient Position: Semi-Kitchen's, Cuff Size: Adult Regular)   Pulse 83   Temp 98.7  F (37.1  C) (Oral)   Resp 18   Ht 1.575 m (5' 2\")   Wt 76.8 kg (169 lb 4.8 oz)   SpO2 94%   BMI 30.97 kg/m          Plan of Care Reviewed With: patient    Overall Patient Progress: improvingOverall Patient Progress: improving    Outcome Evaluation: Pt arrived to floor at 1100. Pt a/o x4. C/o R wrist pain, scheduled tylenol given with relief. Voided x2. Dressing CDI, sling on for comfort.      Problem: Adult Inpatient Plan of Care  Goal: Plan of Care Review  Description: The Plan of Care Review/Shift note should be completed every shift.  The Outcome Evaluation is a brief statement about your assessment that the patient is improving, declining, or no change.  This information will be displayed automatically on your shift  note.  8/14/2024 1734 by Bianca Friend, RN  Outcome: Met  8/14/2024 1553 by Bianca Friend, RN  Outcome: Progressing  Flowsheets (Taken 8/14/2024 1551)  Outcome Evaluation: Pt arrived to floor at 1100. Pt a/o x4. C/o R wrist pain, scheduled tylenol given with relief. Voided x2. Dressing CDI, sling on for comfort.  Plan of Care Reviewed With: patient  Overall Patient Progress: improving  Goal: Patient-Specific Goal (Individualized)  Description: You can add care plan individualizations to a care plan. " "Examples of Individualization might be:  \"Parent requests to be called daily at 9am for status\", \"I have a hard time hearing out of my right ear\", or \"Do not touch me to wake me up as it startles  me\".  8/14/2024 1734 by Bianca Friend RN  Outcome: Met  8/14/2024 1553 by Bianca Friend RN  Outcome: Progressing  Goal: Absence of Hospital-Acquired Illness or Injury  8/14/2024 1734 by Bianca Friend RN  Outcome: Met  8/14/2024 1553 by Bianca Friend RN  Outcome: Progressing  Intervention: Identify and Manage Fall Risk  Recent Flowsheet Documentation  Taken 8/14/2024 1120 by Bianca Friend RN  Safety Promotion/Fall Prevention:   activity supervised   assistive device/personal items within reach   clutter free environment maintained   increased rounding and observation   nonskid shoes/slippers when out of bed   room near nurse's station   room organization consistent   safety round/check completed   supervised activity  Intervention: Prevent Skin Injury  Recent Flowsheet Documentation  Taken 8/14/2024 1120 by Bianca Friend RN  Body Position:   position changed independently   sitting up in bed  Intervention: Prevent and Manage VTE (Venous Thromboembolism) Risk  Recent Flowsheet Documentation  Taken 8/14/2024 1120 by Bianca Friend RN  VTE Prevention/Management: SCDs off (sequential compression devices)  Intervention: Prevent Infection  Recent Flowsheet Documentation  Taken 8/14/2024 1120 by Bianca Friend RN  Infection Prevention:   equipment surfaces disinfected   hand hygiene promoted   personal protective equipment utilized   rest/sleep promoted   single patient room provided  Goal: Optimal Comfort and Wellbeing  8/14/2024 1734 by Bianca Friend RN  Outcome: Met  8/14/2024 1553 by Bianca Friend RN  Outcome: Progressing  Intervention: Monitor Pain and Promote Comfort  Recent Flowsheet Documentation  Taken 8/14/2024 1108 by Bianca Friend RN  Pain " Management Interventions: cold applied  Goal: Readiness for Transition of Care  8/14/2024 1734 by Bianca Friend RN  Outcome: Met  8/14/2024 1553 by Bianca Friend RN  Outcome: Progressing     Problem: Pain Acute  Goal: Optimal Pain Control and Function  8/14/2024 1734 by Bianca Friend RN  Outcome: Met  8/14/2024 1553 by Bianca Friend RN  Outcome: Progressing  Intervention: Develop Pain Management Plan  Recent Flowsheet Documentation  Taken 8/14/2024 1108 by Bianca Friend RN  Pain Management Interventions: cold applied  Intervention: Prevent or Manage Pain  Recent Flowsheet Documentation  Taken 8/14/2024 1120 by Bianca Friend RN  Medication Review/Management: medications reviewed  Intervention: Optimize Psychosocial Wellbeing  Recent Flowsheet Documentation  Taken 8/14/2024 1120 by Bianca Friend RN  Supportive Measures:   active listening utilized   decision-making supported   goal-setting facilitated   positive reinforcement provided   relaxation techniques promoted   self-care encouraged   problem-solving facilitated   self-reflection promoted   self-responsibility promoted   verbalization of feelings encouraged     Problem: Fall Injury Risk  Goal: Absence of Fall and Fall-Related Injury  8/14/2024 1734 by Bianca Friend RN  Outcome: Met  8/14/2024 1553 by Bianca Friend RN  Outcome: Progressing  Intervention: Identify and Manage Contributors  Recent Flowsheet Documentation  Taken 8/14/2024 1120 by Bianca Friend RN  Medication Review/Management: medications reviewed  Intervention: Promote Injury-Free Environment  Recent Flowsheet Documentation  Taken 8/14/2024 1120 by Bianca Friend RN  Safety Promotion/Fall Prevention:   activity supervised   assistive device/personal items within reach   clutter free environment maintained   increased rounding and observation   nonskid shoes/slippers when out of bed   room near nurse's station   room  organization consistent   safety round/check completed   supervised activity     Problem: Comorbidity Management  Goal: Blood Pressure in Desired Range  8/14/2024 1734 by Bianca Friend, RN  Outcome: Met  8/14/2024 1553 by Bianca Friend, RN  Outcome: Progressing  Intervention: Maintain Blood Pressure Management  Recent Flowsheet Documentation  Taken 8/14/2024 1120 by Bianca Friend, RN  Medication Review/Management: medications reviewed

## 2024-08-14 NOTE — DISCHARGE INSTRUCTIONS
Post op instructions in splint   What to expect the first few days after surgery:  After the procedure, your hand will be bandaged in a splint.  Your fingers will be free but may get stiff and sore due to swelling in the hand. You are encouraged to move and use all fingers not included in the splint for light things right away.  This includes things like eating, dressing, and typing.   Your fingers may be numb for several hours after the procedure from the numbing medication or block given by anesthesia.  This will eventually resolve. Take pain medication (start with Tylenol and Ibuprofen) when the numbness begins to go away.   Elevating the arm above the level of the heart during the first few days after surgery will minimize swelling. Sleep with a pillow over your chest and the arm on the pillow.   You can apply ice packs over your splint or in your elbow crease or armpit above the splint for 10-15 minutes every hour while awake.   Do not remove the splint. Keep the splint clean, dry and intact. The splint will be removed at your first post op appointment. Cast protectors for showering are widely available on [employer], [employer] or [employer].   What to expect during the healing process:  The incisions will be healed by two weeks. Sutures will be removed at this time.   Bones take about 6 weeks to heal   Pain medications:  Please take ibuprofen (advil or motrin) and/or tylenol (acetaminophen) for pain control.   Ibuprofen: up to 600 mg three times per day. We recommend you take this with meals.    Do not take if you're on blood thinners, have a history of GI bleed or kidney problems.   Tylenol:  up to 1000 mg (2 extra strength or 3 regular strength) up to three times per day.   You can take these medications separately but It is also safe to combine these mediations together.   Be mindful that there is often acetaminophen included in pain medications such as Vicodin or Percocet. You can take additional tylenol  but keep track of the total amount of acetaminophen. Do not exceed 4000 mg total per day.   We may also give you a small, one time prescription for some narcotic pain medication to use for the first 1-3 days after surgery.   Constipation is a common side effect from the pain medication- drink lots of water and take over the counter Colace and/or Senna as directed on the bottle while taking pain medication. Miralax can also be helpful.     Return to work:  If you primarily do deskwork (typing, etc ), you may return to work within a few days.   If you do more active work requiring heavy lifting or repetitive handling of small objects, you may not return to work until you are cleared by Dr. Ellison.     Follow up:  Please schedule a follow-up appointment 10-14 days after your procedure.   At this appointment the wound will be checked, and stitches will be removed.   Please schedule a second follow-up appointment with your surgeon 6 weeks after your procedure.   Other questions?  Infection is rare but please watch for signs and symptoms of infection and call our office immediately and arrange to be seen urgently if you experience any of the following:   Redness spreading beyond the area directly around the splint  Fever (>101.5 F)  Increased pain after the initial post op pain has subsided.  Please do not hesitate to call our office at with any questions or concerns or write them down and bring them to your next appointment.       Dr. Ellison's clinical care coordinator - Emelyn Murray - please direct any questions, concerns to her after surgery  She will reach out to you within a few days to schedule a follow up appointment   Her phone number is [phone]          SIMPLE FINGER MOTION EXERCISES:    Perform 10 reps at least 3 times a day.   It may be painful to move your fingers at first, but the pain and swelling in your fingers should improve as you do the exercises.   It's okay and encouraged to use your other hand  to help move your fingers into the correct position.     Straight:  All finger joints straight.     Tabletop:  Bend the joints where your fingers meet your hand (MP joints), keep your other finger joints (IP joints) straight.     Claw: Straighten the joints where your fingers meet your hand (MP joints), bend your other finger joints.     Fist: Bend all three joints to bring your fingertips to your palm.

## 2024-08-14 NOTE — DISCHARGE SUMMARY
"[facility]  Hospitalist Discharge Summary      Date of Admission:  8/12/2024  Date of Discharge:  8/14/2024  Discharging Provider: Joe Rivera MD  Discharge Service: Hospitalist Service    Discharge Diagnoses   Acute Right distal displaced radial fracture   Hypothyroidism  Hypertensive Urgency     Clinically Significant Risk Factors     # Obesity: Estimated body mass index is 30.97 kg/m  as calculated from the following:    Height as of this encounter: 1.575 m (5' 2\").    Weight as of this encounter: 76.8 kg (169 lb 4.8 oz).       Follow-ups Needed After Discharge   Follow-up Appointments     Follow Up Care      Follow-up with your Surgeon Team in 2 weeks for wound check.            Discharge Disposition   Discharged to home  Condition at discharge: Stable    Hospital Course     Alyson Lorenz is a 63 year old hypothyroidism, osteopenia patient who came to [facility] 8/12/2024 after she fell 5 days before admission on 8/7/2024 injuring her right wrist but subsequently seen in [facility] urgent care and was found to have displaced right distal radial fracture he was evaluated by orthopedic surgery and underwent ORIF distal radius fracture right > 3 part operative management 8/14/2024 following which she was discharged by Orthopedics service.          Consultations This Hospital Stay   ORTHOPEDIC SURGERY IP CONSULT  OCCUPATIONAL THERAPY ADULT IP CONSULT  PHYSICAL THERAPY ADULT IP CONSULT    Code Status   Full Code    Time Spent on this Encounter   I, Joe Rivera MD, personally saw the patient today and spent less than or equal to 30 minutes discharging this patient.       Joe Rivera MD  [facility]  [address] [facility] [address]  Phone: [phone]  Fax: [phone]  ______________________________________________________________________    Physical Exam   Vital Signs: Temp: 98.7  F (37.1  C) Temp src: Oral BP: (!) 152/92 Pulse: 83   " "Resp: 18 SpO2: 94 % O2 Device: None (Room air) Oxygen Delivery: 2 LPM  Weight: 169 lbs 4.8 oz      GENERAL: Patient is not in acute distress  HEENT: EOM+,Conjunctiva is clear   NECK:  no Jugular Venous distention  HEART: S1 S2 regular Rate and Rhythm, there is  no murmur,   LUNGS: Respirations are  not laboured, Lungs are  clear to auscultation without Crepitations or Wheezing   ABDOMEN: Soft, there is no tenderness, Bowel Sounds are Positive   LOWER LIMBS: no Pedal Edema Bilaterally   CNS:  Alert,  Oriented x 3, Moving all the Four Limbs        Primary Care Physician   [facility]    Discharge Orders      Reason for your hospital stay    Distal Radius Fx     When to call - Contact Surgeon Team    You may experience symptoms that require follow-up before your scheduled appointment. Refer to the \"Stoplight Tool\" for instructions on when to contact your Surgeon Team if you are concerned about pain control, blood clots, constipation, or if you are unable to urinate.     When to call - Reach out to Urgent Care    If you are not able to reach your Surgeon Team and you need immediate care, go to the Orthopedic Walk-in Clinic or Urgent Care at your Surgeon's office.  Do NOT go to the Emergency Room unless you have shortness of breath, chest pain, or other signs of a medical emergency.     When to call - Reasons to Call 911    Call 911 immediately if you experience sudden-onset chest pain, arm weakness/numbness, slurred speech, or shortness of breath     Discharge Instruction - Breathing exercises    Perform breathing exercises 10 times per hours while awake for 2 weeks. (If given, use your Incentive Spirometer)     Symptoms - Fever Management    A low grade fever can be expected after surgery.  Use acetaminophen (TYLENOL) as needed for fever management.  Contact your Surgeon Team if you have a fever greater than 101.5 F, chills, and/or night sweats.     Symptoms - Constipation management    Constipation (hard, " dry bowel movements) is expected after surgery due to the combination of being less active, the anesthetic, and the opioid pain medication.  You can do the following to help reduce constipation:  ~  FLUIDS:  Drink clear liquids (water or Gatorade), or juice (apple/prune).  ~  DIET:  Eat a fiber rich diet.    ~  ACTIVITY:  Get up and move around several times a day.  Increase your activity as you are able.  MEDICATIONS:  Reduce the risk of constipation by starting medications before you are constipated.  You can take Miralax   (1 packet as directed) and/or a stool softener (Senokot 1-2 tablets 1-2 times a day).  If you already have constipation and these medications are not working, you can get magnesium citrate and use as directed.  If you continue to have constipation you can try an over the counter suppository or enema.  Call your Surgeon Team if it has been greater than 3 days since your last bowel movement.     Symptoms - Reduced Urine Output    Changes in the amount of fluids you drank before and after surgery may result in problems urinating.  It is important to stay well-hydrated after surgery and drink plenty of water. If it has been greater than 8 hours since you have urinated despite drinking plenty of water, call your Surgeon Team.     Activity - Exercises to prevent blood clots    Unless otherwise directed by your Surgeon team, perform the following exercises at least three times per day for the first four weeks after surgery to prevent blood clots in your legs: 1) Point and flex your feet (Ankle Pumps), 2) Move your ankle around in big circles, 3) Wiggle your toes, 4) Walk, even for short distances, several times a day, will help decrease the risk of blood clots.     Comfort and Pain Management - Pain after Surgery    Pain after surgery is normal and expected.  You will have some amount of pain for several weeks after surgery.  Your pain will improve with time.  There are several things you can do to  help reduce your pain including: rest, ice, elevation, and using pain medications as needed. Contact your Surgeon Team if you have pain that persists or worsens after surgery despite rest, ice, elevation, and taking your medication(s) as prescribed. Contact your Surgeon Team if you have new numbness, tingling, or weakness in your operative extremity.     Comfort and Pain Management - Swelling after Surgery    Swelling and/or bruising of the surgical extremity is common and may persist for several months after surgery. In addition to frequent icing and elevation, gentle compressive support with an ACE wrap or tubigrip may help with swelling. Apply compression regularly, removing at least twice daily to perform skin checks. Contact your Surgeon Team if your swelling increases and is NOT associated with an increase in your activity level, or if your swelling increases and is associated with redness and pain.     Comfort and Pain Management - Cold therapy    Ice can be used to control swelling and discomfort after surgery. Place a thin towel over your operative site and apply the ice pack overtop. Leave ice pack in place for 20 minutes, then remove for 20 minutes. Repeat this 20 minutes on/20 minutes off routine as often as tolerated.     Medication Instructions - Acetaminophen (TYLENOL) Instructions    You were discharged with acetaminophen (TYLENOL) for pain management after surgery. Acetaminophen most effectively manages pain symptoms when it is taken on a schedule without missing doses (every four, six, or eight hours). Your Provider will prescribe a safe daily dose between 3000 - 4000 mg.  Do NOT exceed this daily dose. Most patients use acetaminophen for pain control for the first four weeks after surgery.  You can wean from this medication as your pain decreases.     Medication Instructions - NSAID Instructions    You were discharged with an anti-inflammatory medication for pain management to use in combination  with acetaminophen (TYLENOL) and the narcotic pain medication.  Take this medication exactly as directed.  You should only take one anti-inflammatory at a time.  Some common anti-inflammatories include: ibuprofen (ADVIL, MOTRIN), naproxen (ALEVE, NAPROSYN), celecoxib (CELEBREX), meloxicam (MOBIC), ketorolac (TORADOL).  Take this medication with food and water.     Follow Up Care    Follow-up with your Surgeon Team in 2 weeks for wound check.     Medication instructions -  Anticoagulation - aspirin    Take the aspirin as prescribed for a total of four weeks after surgery.  This is given to help minimize your risk of blood clot.     Comfort and Pain Management - UPPER extremity Elevation    Swelling is expected for several months after surgery. This type of swelling is usually associated with gravity and activity, and can be improved with elevation.   The best way to do this is to get your hand above your heart by sitting down, resting your elbow on a pillow or arm rest, with your hand in the air. Perform this elevation as often as possible especially for the first two weeks after surgery     Opioid Instructions (Less than 65 years)    You were discharged with an opioid medication (hydromorphone, oxycodone, hydrocodone, or tramadol). This medication should only be taken for breakthrough pain that is not controlled with acetaminophen (TYLENOL). If you rate your pain less than 3 you do not need this medication. Pain rating 0-3: You do not need this medication. Pain rating 4-6: Take 1 tablet every 4-6 hours as needed Pain rating 7-10: Take 2 tablets every 4-6 hours as needed. Do not exceed 6 tablets per day     Medication Instructions - Opioids - Tapering Instructions    In the first three days following surgery, your symptoms may warrant use of the narcotic pain medication every four to six hours as prescribed. This is normal. As your pain symptoms improve, focus your efforts on decreasing (tapering) use of narcotic  medications. The most successful tapering strategy is to first, decrease the number of tablets you take every 4-6 hours to the minimum prescribed. Then, increase the amount of time between doses. For example: First, taper to   or 1 tablet every 4-6 hours. Then, taper to   or 1 tablet every 6-8 hours. Then, taper to   or 1 tablet every 8-10 hours. Then, taper to   or 1 tablet every 10-12 hours. Then, taper to   or 1 tablet at bedtime. The bedtime dose can help with comfort during sleep and is typically the last dose to be discontinued after surgery.     Return to Driving    Return to driving - Driving is NOT permitted until directed by your provider. Under no circumstance are you permitted to drive while using narcotic pain medications.     Dressing / Wound Care - Wound    You have a clean dressing on your surgical wound. Dressing change instructions as follows: dressing will be removed at your follow-up appointment. Contact your Surgeon Team if you have increased redness, warmth around the surgical wound, and/or drainage from the surgical wound.     Dressing / Wound Care - NO Tub Bathing    Tub bathing, swimming, or any other activities that will cause your incision to be submerged in water should be avoided until allowed by your Surgeon.     Precautions    No weightbearing with RUE     NO weight bearing    No weight bearing to operative extremity.     Splint / Cast    Keep the cast/splint clean, dry, and intact.  Cover the cast/splint for showering. Notify your Surgeon Team if   you begin to experience new pain or numbness, or skin breakdown.     Dressing / Wound care - Shower with wound/dressing covered    You must COVER your dressing or incision with saran wrap (or any other non-permeable covering) to allow the incision to remain dry while showering.  You may shower 2 days after surgery as long as the surgical wound stays dry. Continue to cover your dressing or incision for showering until your first office  visit.  You are strictly prohibited from soaking   or submerging the surgical wound underwater.     No VTE Prophylaxis     Wrist range of motion    Perform Fist pumps every hour while you are awake.     Discharge Instruction - Regular Diet Adult    Return to your pre-surgery diet unless instructed otherwise       Significant Results and Procedures   Most Recent 3 CBC's:  Recent Labs   Lab Test 08/12/24  1456   WBC 6.0   HGB 12.3   MCV 86        Most Recent 3 BMP's:  Recent Labs   Lab Test 08/12/24  1455      POTASSIUM 3.7   CHLORIDE 104   CO2 25   BUN 13.6   CR 0.70   ANIONGAP 10   CHICO 9.1   GLC 98   ,   Results for orders placed or performed during the hospital encounter of 08/12/24   XR Hand Right 3 Views    Narrative    XR HAND RIGHT G/E 3 VIEWS, XR WRIST RIGHT G/E 3 VIEWS 8/12/2024 3:11  PM     HISTORY: trauma    COMPARISON: None.       Impression    IMPRESSION:  Acute displaced and foreshortened fracture of the distal radius with  the distal radial fracture fragment displaced dorsally by at least 1.4  cm. Fracture line extends into the distal radial ulnar joint without  widening. Radial carpal alignment appears maintained. Soft tissue  swelling throughout the wrist surrounding the fracture site. Diffuse  osteopenia.    Negrito Lerner MD         SYSTEM ID:  [id number]   XR Wrist Right 3 Views    Narrative    XR HAND RIGHT G/E 3 VIEWS, XR WRIST RIGHT G/E 3 VIEWS 8/12/2024 3:11  PM     HISTORY: trauma    COMPARISON: None.       Impression    IMPRESSION:  Acute displaced and foreshortened fracture of the distal radius with  the distal radial fracture fragment displaced dorsally by at least 1.4  cm. Fracture line extends into the distal radial ulnar joint without  widening. Radial carpal alignment appears maintained. Soft tissue  swelling throughout the wrist surrounding the fracture site. Diffuse  osteopenia.    Negrito Lerner MD         SYSTEM ID:  [id number]   XR Surgery IVETH L/T 5 Min Fluoro w Stills     Narrative    This exam was marked as non-reportable because it will not be read by a   radiologist or a [place] non-radiologist provider.         XR Wrist Right G/E 3 Views    Narrative    EXAM: XR WRIST RIGHT G/E 3 VIEWS  LOCATION: [facility]  DATE: 8/12/2024    INDICATION: Post reduction.  COMPARISON: 8/12/2024 at 1501 hours.      Impression    IMPRESSION: Acute comminuted moderately displaced intra-articular fracture of the distal radius with moderate improvement in the alignment of the fracture fragments when compared with the prior study.   XR Surgery IVETH L/T 5 Min Fluoro w Stills    Narrative    This exam was marked as non-reportable because it will not be read by a   radiologist or a [place] non-radiologist provider.             Discharge Medications   Current Discharge Medication List        START taking these medications    Details   ondansetron (ZOFRAN) 4 MG tablet Take 1 tablet (4 mg) by mouth every 8 hours as needed for nausea  Qty: 5 tablet, Refills: 1    Associated Diagnoses: Closed Colles' fracture of right radius, initial encounter      oxyCODONE (ROXICODONE) 5 MG tablet Take 1-2 tablets (5-10 mg) by mouth every 4 hours as needed for moderate to severe pain  Qty: 20 tablet, Refills: 0    Associated Diagnoses: Closed Colles' fracture of right radius, initial encounter      senna-docusate (SENOKOT-S/PERICOLACE) 8.6-50 MG tablet Take 1-2 tablets by mouth 2 times daily Take while on oral narcotics to prevent or treat constipation.  Qty: 30 tablet, Refills: 0    Comments: While taking narcotics  Associated Diagnoses: Closed Colles' fracture of right radius, initial encounter      Vitamin D3 (CHOLECALCIFEROL) 25 mcg (1000 units) tablet Take 2 tablets (50 mcg) by mouth daily for 30 days  Qty: 60 tablet, Refills: 0    Associated Diagnoses: Closed Colles' fracture of right radius, initial encounter           CONTINUE these medications which have CHANGED    Details   !! acetaminophen  (TYLENOL) 325 MG tablet Take 3 tablets (975 mg) by mouth every 8 hours as needed for pain  Qty: 50 tablet, Refills: 1    Associated Diagnoses: Closed Colles' fracture of right radius, initial encounter      !! acetaminophen (TYLENOL) 325 MG tablet Take 2 tablets (650 mg) by mouth every 4 hours as needed for other (mild pain)  Qty: 100 tablet, Refills: 0    Associated Diagnoses: Closed Colles' fracture of right radius, initial encounter      !! acetaminophen (TYLENOL) 500 MG tablet Take 2 tablets (1,000 mg) by mouth every 8 hours  Qty: 50 tablet, Refills: 1    Associated Diagnoses: Closed Colles' fracture of right radius, initial encounter      ibuprofen (ADVIL/MOTRIN) 600 MG tablet Take 1 tablet (600 mg) by mouth every 6 hours as needed for moderate pain  Qty: 40 tablet, Refills: 1    Associated Diagnoses: Closed Colles' fracture of right radius, initial encounter       !! - Potential duplicate medications found. Please discuss with provider.        CONTINUE these medications which have NOT CHANGED    Details   levothyroxine (SYNTHROID/LEVOTHROID) 100 MCG tablet Take 1 Tablet (100 mcg) by mouth before breakfast.           Allergies   Allergies   Allergen Reactions    Penicillins Anaphylaxis    Aspirin Rash     G6PD deficiency    Sulfa Antibiotics Rash

## 2024-08-14 NOTE — ANESTHESIA CARE TRANSFER NOTE
Patient: Alyson Lorenz    Procedure: Procedure(s):  OPEN REDUCTION INTERNAL FIXATION, FRACTURE, WRIST, other procedured to Right wrist as indicated       Diagnosis: Closed Colles' fracture of right radius, initial encounter [S52.957C]  Diagnosis Additional Information: No value filed.    Anesthesia Type:   General     Note:    Oropharynx: oropharynx clear of all foreign objects and spontaneously breathing  Level of Consciousness: awake  Oxygen Supplementation: face mask  Level of Supplemental Oxygen (L/min / FiO2): 6  Independent Airway: airway patency satisfactory and stable  Dentition: dentition unchanged  Vital Signs Stable: post-procedure vital signs reviewed and stable  Report to RN Given: handoff report given  Patient transferred to: PACU    Handoff Report: Identifed the Patient, Identified the Reponsible Provider, Reviewed the pertinent medical history, Discussed the surgical course, Reviewed Intra-OP anesthesia mangement and issues during anesthesia, Set expectations for post-procedure period and Allowed opportunity for questions and acknowledgement of understanding      Vitals:  Vitals Value Taken Time   /73 08/14/24 0855   Temp     Pulse 76 08/14/24 0856   Resp 15 08/14/24 0857   SpO2 97 % 08/14/24 0857   Vitals shown include unfiled device data.    Electronically Signed By: PJ Bacon CRNA  August 14, 2024  8:58 AM

## 2024-08-14 NOTE — ANESTHESIA PROCEDURE NOTES
Brachial plexus Procedure Note    Pre-Procedure   Staff -        Anesthesiologist:  Juanita Cho MD       Performed By: anesthesiologist       Referred By: [facility]       Location: pre-op       Pre-Anesthestic Checklist: patient identified, IV checked, site marked, risks and benefits discussed, informed consent, monitors and equipment checked, pre-op evaluation, at physician/surgeon's request and post-op pain management  Timeout:       Correct Patient: Yes        Correct Procedure: Yes        Correct Site: Yes        Correct Position: Yes        Correct Laterality: Yes        Site Marked: Yes  Procedure Documentation  Procedure: Brachial plexus       Diagnosis: ARM PAIN       Laterality: right       Patient Position: sitting       Patient Prep/Sterile Barriers: sterile gloves, mask       Skin prep: Chloraprep       Local skin infiltrated with 3 mL of 1% lidocaine.  (supraclavicular approach).       Needle Gauge: 20.        Needle Length (Inches): 4        Ultrasound guided       1. Ultrasound was used to identify targeted nerve, plexus, vascular marker, or fascial plane and place a needle adjacent to it in real-time.       2. Ultrasound was used to visualize the spread of anesthetic in close proximity to the above referenced structure.       4. The visualized anatomic structures appeared normal.       5. There were no apparent abnormal pathologic findings.    Assessment/Narrative         The placement was negative for: blood aspirated, painful injection and site bleeding       Paresthesias: No.       Bolus given via needle. no blood aspirated via catheter.        Secured via.        Insertion/Infusion Method: Single Shot       Complications: none    Medication(s) Administered   Bupivacaine 0.5% w/ 1:200K Epi (Other) - Other   25 mL - 8/14/2024 7:15:00 AM  midazolam (VERSED) injection - Intravenous   2 mg - 8/14/2024 7:15:00 AM    FOR [facility] ([facility]) ONLY:   Pain Team Contact information: please page the  "Pain Team Via [facility]. Search \"Pain\". During daytime hours, please page the attending first. At night please page the resident first.      "

## 2024-08-14 NOTE — PROVIDER NOTIFICATION
Amalia messaged Dr. Rivera that pt c/o chest tightness, only lasted a few minutes and then it went away.

## 2024-08-14 NOTE — OP NOTE
Orthopaedic Surgery Op-Note     Patient: Alyson Lorenz  : 1960  Date of Service: 2024 8:58 AM    Pre-operative Diagnosis:    R comminuted interarticular distal radius fracture   Post-operative Diagnosis:    same    Procedure(s):    ORIF distal radius fracture right > 3 parts   Ordering, taking, interpretation of intraoperative X-rays of the wrist     Surgeon:  Markel Ellison MD   Assistant(s):  Sunny Contreras PAC     Anesthesia: A regional block was performed by anesthesia + general   Estimated Blood Loss:  10 mL  Tourniquet time: 53 minutes  Indications:  See preoperative H&P  Findings:  comminuted interarticular distal radius fracture, DRUJ stable   Complications:  none   Implants:    Implant Name Type Inv. Item Serial No.  Lot No. LRB No. Used Action   PLATE LOCKING DVR ANATOMIC STD R - [id number] Metallic Hardware/[place] PLATE LOCKING DVR ANATOMIC STD R  [employer] LOAD 8006, 13 AUG 2024 Right 1 Implanted   SCREW LP 2.7X12MM - [id number] Metallic Hardware/[place] SCREW LP 2.7X12MM  [employer] LOAD 8006, 13 AUG 2024 Right 1 Implanted   SCREW LP 2.7X14MM - [id number] Metallic Hardware/[place] SCREW LP 2.7X14MM  [employer] LOAD 8006, 13 AUG 2024 Right 1 Implanted   SCREW LP 2.7X15MM - [id number] Metallic Hardware/[place] SCREW LP 2.7X15MM  [employer] LOAD 8006, 13 AUG 2024 Right 1 Implanted   SCREW LOCKING SQUARE 2.7X14MM - [id number] Metallic Hardware/[place] SCREW LOCKING SQUARE 2.7X14MM  [employer] LOAD 8006, 13 AUG 2024 Right 3 Implanted   IMP WIRE YESSENIA 1.6MM [id number] - [id number]  IMP WIRE YESSENIA 1.6MM [id number]  [employer] LOAD 8006, 13 AUG 2024 Right 4 Used as a Supply     Condition:  stable     Indications:  Alyson Lorenz is a pleasant 63 year old female with a history of right distal radius fracture. Given the displacement and patient age and activity level surgical fixation is recommended. The patient has a mild median nerve neuropraxia which has  been improving since her fracture was reduced. The risks, benefits and alternatives to surgery were reviewed with the patient and all questions were answered to their understanding and satisfaction. Risks of surgery include, but are not limited to infection, bleeding, continued pain, neurovascular injury, fracture, loss of motion, instability, need for future procedure,  malunion, nonunion, loss of fixation.  Also reviewed were possibility of DVT, PE, cardiac arrest, loss of life or limb. Written consent was obtained from the patient directly by Markel Ellison MD    Description of Procedure:    The patient was identified in preoperative holding and the correct operative extremity was marked by the surgical staff. The patient was then transferred to the operative suite. Anesthesia was administered by the anesthesia department. The patient was prepped and draped in the standard sterile fashion. Antibiotics were infused prior to surgical incision. A multidisciplinary time-out was performed, identifying the correct patient and operative extremity. An ace bandage was used to exsanguinate the extremity and the tourniquet was elevated prior to incision.    A standard FCR approach to the distal radius was made full-thickness flaps were elevated with a tenotomy scissor.  The FCR was retracted ulnarly and the fascia was incised.  The FPL was retracted ulnarly.  The pronator was identified and incised along the radial border.  This was elevated with a [place] elevator.  A release of the brachial radialis was performed.  Finger traps and 15 pounds of traction were placed to aid in the reduction.  A clamp was placed around the distal radius and the fracture was reduced with some effort.  There was significant comminution and bone loss over the volar central portion at the site of the intra-articular split.  Two 0.62 K wires were placed for temporary fixation.  A [employer] cross lock distal radius plate was selected and placed.   This was secured with K wires.  The oblong shaft screw was drilled and placed this was left somewhat prominent to allow for good fixation at the distal radius.  All of the distal screws were placed followed by the shaft screws.  The radial styloid was significantly comminuted.  A small portion of the styloid was resected and the cortical portion was tucked under the plate no fixation was possible in this portion of the fracture.    Interpretation of intraoperative XR AP, lateral and oblique of the radius demonstrates well positioned hardware in good alignment.  The distal radius fracture was reduced and the articular surface was noted to be congruent.    The tourniquet was released, wound was copious irrigated.  10 cc of 1% lidocaine with epinephrine were injected in the soft tissues and the pronator.  The pronator was reapproximated with 3-0 Vicryl.  The skin was closed with 4-0 nylon. A sterile dressing and volar splint were applied. All counts were correct. The patient was awoken from anesthesia and transferred to the PACU in stable condition.    Post op Plan:    Nonweightbearing operative extremity  Follow up in 2 weeks, transition to removable splint.  Given the comminution we will proceed cautiously with active range of motion starting at 4 weeks postop.  No motion between 2 and 4 weeks.    Sunny Contreras PA-C's assistance was needed for draping, retraction and hardware placement assistance.     Markel Ellison MD   [facility]   Hand & Upper Extremity

## 2024-08-14 NOTE — PLAN OF CARE
"CARE FROM 2086-4022    BP (!) 152/84 (BP Location: Left arm)   Pulse 83   Temp 98.5  F (36.9  C) (Oral)   Resp 18   Ht 1.575 m (5' 2\")   Wt 76.8 kg (169 lb 4.8 oz)   SpO2 98%   BMI 30.97 kg/m      Goal Outcome Evaluation:      Plan of Care Reviewed With: patient    Overall Patient Progress: no changeOverall Patient Progress: no change    Outcome Evaluation: NPO since midnight. Plan for R radial ORIF at 0730. Pt showered/CHG bath completed. CHG wipes done this AM. NWB to wrist. CMS to RUE intact/stable-- does report some mild numbness. Trace swelling to fingers noted. Pain managed well w/ tyl and oxy overnight. Transferred to PACU via cart at 0545.    Problem: Adult Inpatient Plan of Care  Goal: Plan of Care Review  Description: The Plan of Care Review/Shift note should be completed every shift.  The Outcome Evaluation is a brief statement about your assessment that the patient is improving, declining, or no change.  This information will be displayed automatically on your shift  note.  Outcome: Progressing  Flowsheets (Taken 8/14/2024 0023)  Outcome Evaluation: Plan for R radial ORIF at 0730. Pt showered/CHG bath completed.  Plan of Care Reviewed With: patient  Overall Patient Progress: no change  Goal: Patient-Specific Goal (Individualized)  Description: You can add care plan individualizations to a care plan. Examples of Individualization might be:  \"Parent requests to be called daily at 9am for status\", \"I have a hard time hearing out of my right ear\", or \"Do not touch me to wake me up as it startles  me\".  Outcome: Progressing  Goal: Absence of Hospital-Acquired Illness or Injury  Outcome: Progressing  Intervention: Identify and Manage Fall Risk  Recent Flowsheet Documentation  Taken 8/13/2024 2125 by Carolyn Mcgowan RN  Safety Promotion/Fall Prevention:   activity supervised   assistive device/personal items within reach   clutter free environment maintained   nonskid shoes/slippers when out of " "bed   patient and family education   room near nurse's station   room organization consistent   safety round/check completed   treat reversible contributory factors   treat underlying cause   increase visualization of patient  Intervention: Prevent Infection  Recent Flowsheet Documentation  Taken 8/14/2024 0000 by Carolyn Mcgowan, RN  Infection Prevention:   rest/sleep promoted   single patient room provided   hand hygiene promoted  Goal: Optimal Comfort and Wellbeing  Outcome: Progressing  Intervention: Monitor Pain and Promote Comfort  Recent Flowsheet Documentation  Taken 8/13/2024 2345 by Carolyn Mcgowan, RN  Pain Management Interventions: declines  Taken 8/13/2024 2225 by Carolyn Mcgowan, RN  Pain Management Interventions: (\"I think it will settle down once I get back in bed\") declines  Taken 8/13/2024 2125 by Carolyn Mcgowan, RN  Pain Management Interventions: medication (see MAR)  Taken 8/13/2024 2040 by Carolyn Mcgowan, RN  Pain Management Interventions: declines  Goal: Readiness for Transition of Care  Outcome: Progressing     "

## 2024-08-14 NOTE — ANESTHESIA PROCEDURE NOTES
Airway       Patient location during procedure: OR  Staff -        CRNA: Winsome Kent APRN CRNA       Performed By: CRNA  Consent for Airway        Urgency: elective  Indications and Patient Condition       Indications for airway management: deyvi-procedural       Induction type:intravenous       Mask difficulty assessment: 1 - vent by mask    Final Airway Details       Final airway type: supraglottic airway    Supraglottic Airway Details        Type: LMA       Brand: I-Gel       LMA size: 4    Post intubation assessment        Placement verified by: capnometry, equal breath sounds and chest rise        Number of attempts at approach: 1       Number of other approaches attempted: 0       Secured with: commercial tube chiang       Ease of procedure: easy       Dentition: Unchanged

## 2024-08-14 NOTE — ANESTHESIA POSTPROCEDURE EVALUATION
Patient: Alyson Lorenz    Procedure: Procedure(s):  OPEN REDUCTION INTERNAL FIXATION, FRACTURE, WRIST, other procedured to Right wrist as indicated       Anesthesia Type:  General    Note:  Disposition: Outpatient   Postop Pain Control:    PONV: No   Neuro/Psych: Uneventful            Sign Out: Acceptable/Baseline neuro status   Airway/Respiratory: Uneventful            Sign Out: Acceptable/Baseline resp. status   CV/Hemodynamics: Uneventful            Sign Out: Acceptable CV status; No obvious hypovolemia; No obvious fluid overload   Other NRE:    DID A NON-ROUTINE EVENT OCCUR? No           Last vitals:  Vitals Value Taken Time   /83 08/14/24 1030   Temp 99  F (37.2  C) 08/14/24 0855   Pulse 66 08/14/24 1040   Resp 21 08/14/24 1040   SpO2 97 % 08/14/24 1040   Vitals shown include unfiled device data.    Electronically Signed By: Juanita Cho MD  August 14, 2024  10:41 AM

## 2024-08-14 NOTE — ANESTHESIA PREPROCEDURE EVALUATION
Anesthesia Pre-Procedure Evaluation    Patient: Alyson Lorenz   MRN: [id number] : 1960        Procedure : Procedure(s):  OPEN REDUCTION INTERNAL FIXATION, FRACTURE, WRIST, other procedured to Right wrist as indicated, possible carpal tunnel release          History reviewed. No pertinent past medical history.   History reviewed. No pertinent surgical history.   Allergies   Allergen Reactions    Penicillins Anaphylaxis    Aspirin Rash     G6PD deficiency    Sulfa Antibiotics Rash      Social History     Tobacco Use    Smoking status: Former     Current packs/day: 1.00     Average packs/day: 1 pack/day for 48.6 years (48.6 ttl pk-yrs)     Types: Cigarettes     Start date:     Smokeless tobacco: Never   Substance Use Topics    Alcohol use: Not on file      Wt Readings from Last 1 Encounters:   24 76.8 kg (169 lb 4.8 oz)        Anesthesia Evaluation   Pt has had prior anesthetic. Type: Regional.    No history of anesthetic complications       ROS/MED HX  ENT/Pulmonary:     (+)                tobacco use, Past use,                    (-) asthma, COPD and recent URI   Neurologic: Comment: Initial numbness in fingertips that is now improved      Cardiovascular:  - neg cardiovascular ROS  (-) arrhythmias and valvular problems/murmurs   METS/Exercise Tolerance:     Hematologic:  - neg hematologic  ROS     Musculoskeletal: Comment: osteopenia      GI/Hepatic:  - neg GI/hepatic ROS  (-) GERD   Renal/Genitourinary:  - neg Renal ROS     Endo:     (+)          thyroid problem, hypothyroidism,           Psychiatric/Substance Use:       Infectious Disease:       Malignancy:       Other:            Physical Exam    Airway        Mallampati: III   TM distance: > 3 FB   Neck ROM: full   Mouth opening: > 3 cm    Respiratory Devices and Support         Dental       (+) Modest Abnormalities - crowns, retainers, 1 or 2 missing teeth      Cardiovascular   cardiovascular exam normal          Pulmonary   pulmonary exam  "normal                OUTSIDE LABS:  CBC:   Lab Results   Component Value Date    WBC 6.0 08/12/2024    HGB 12.3 08/12/2024    HCT 40.1 08/12/2024     08/12/2024     BMP:   Lab Results   Component Value Date     08/12/2024    POTASSIUM 3.7 08/12/2024    CHLORIDE 104 08/12/2024    CO2 25 08/12/2024    BUN 13.6 08/12/2024    CR 0.70 08/12/2024    GLC 98 08/12/2024     COAGS: No results found for: \"PTT\", \"INR\", \"FIBR\"  POC: No results found for: \"BGM\", \"HCG\", \"HCGS\"  HEPATIC: No results found for: \"ALBUMIN\", \"PROTTOTAL\", \"ALT\", \"AST\", \"GGT\", \"ALKPHOS\", \"BILITOTAL\", \"BILIDIRECT\", \"SAYRA\"  OTHER:   Lab Results   Component Value Date    CHICO 9.1 08/12/2024       Anesthesia Plan    ASA Status:  2    NPO Status:  NPO Appropriate    Anesthesia Type: General.     - Airway: LMA   Induction: Intravenous.   Maintenance: Balanced.        Consents    Anesthesia Plan(s) and associated risks, benefits, and realistic alternatives discussed. Questions answered and patient/representative(s) expressed understanding.     - Discussed:     - Discussed with:  Patient      - Extended Intubation/Ventilatory Support Discussed: No.      - Patient is DNR/DNI Status: No     Use of blood products discussed: No .     Postoperative Care    Pain management: IV analgesics, Oral pain medications, Peripheral nerve block (Single Shot), Multi-modal analgesia.   PONV prophylaxis: Ondansetron (or other 5HT-3), Dexamethasone or Solumedrol     Comments:               Juanita Cho MD    I have reviewed the pertinent notes and labs in the chart from the past 30 days and (re)examined the patient.  Any updates or changes from those notes are reflected in this note.             "

## 2024-12-15 ENCOUNTER — HEALTH MAINTENANCE LETTER (OUTPATIENT)
Age: 64
End: 2024-12-15

## (undated) DEVICE — GLOVE BIOGEL PI MICRO SZ 6.5 48565

## (undated) DEVICE — TOURNIQUET SGL BLADDER 18"X4" RED 5921-218-135

## (undated) DEVICE — CAST BUCKET

## (undated) DEVICE — DRAPE STERI TOWEL LG 1010

## (undated) DEVICE — LINEN TOWEL PACK X5 5464

## (undated) DEVICE — BNDG ELASTIC 4"X5YDS UNSTERILE 6611-40

## (undated) DEVICE — SUCTION MANIFOLD NEPTUNE 2 SYS 4 PORT 0702-020-000

## (undated) DEVICE — CAST PLASTER SPLINT 4X15" EXTRA FAST

## (undated) DEVICE — SU VICRYL 3-0 PS-2 27" UND J427H

## (undated) DEVICE — PREP CHLORAPREP 26ML TINTED HI-LITE ORANGE 930815

## (undated) DEVICE — PACK HAND SOP32HARMO

## (undated) DEVICE — SU ETHILON 4-0 PS-2 18" BLACK 1667H

## (undated) DEVICE — DRILL BIT 2.2MM

## (undated) DEVICE — GLOVE BIOGEL PI MICRO INDICATOR UNDERGLOVE SZ 7.0 48970

## (undated) DEVICE — SOL NACL 0.9% IRRIG 1000ML BOTTLE 2F7124

## (undated) DEVICE — TRAP FINGER GRASP DEVICE SM  9906

## (undated) DEVICE — IMM SHOULDER MED 79-84015

## (undated) RX ORDER — DEXAMETHASONE SODIUM PHOSPHATE 4 MG/ML
INJECTION, SOLUTION INTRA-ARTICULAR; INTRALESIONAL; INTRAMUSCULAR; INTRAVENOUS; SOFT TISSUE
Status: DISPENSED
Start: 2024-08-14

## (undated) RX ORDER — TRANEXAMIC ACID 10 MG/ML
INJECTION, SOLUTION INTRAVENOUS
Status: DISPENSED
Start: 2024-08-14

## (undated) RX ORDER — PROPOFOL 10 MG/ML
INJECTION, EMULSION INTRAVENOUS
Status: DISPENSED
Start: 2024-08-14

## (undated) RX ORDER — CLINDAMYCIN PHOSPHATE 900 MG/50ML
INJECTION, SOLUTION INTRAVENOUS
Status: DISPENSED
Start: 2024-08-14

## (undated) RX ORDER — LIDOCAINE HYDROCHLORIDE 10 MG/ML
INJECTION, SOLUTION EPIDURAL; INFILTRATION; INTRACAUDAL; PERINEURAL
Status: DISPENSED
Start: 2024-08-14

## (undated) RX ORDER — OXYCODONE HYDROCHLORIDE 5 MG/1
TABLET ORAL
Status: DISPENSED
Start: 2024-08-14

## (undated) RX ORDER — FENTANYL CITRATE 50 UG/ML
INJECTION, SOLUTION INTRAMUSCULAR; INTRAVENOUS
Status: DISPENSED
Start: 2024-08-14

## (undated) RX ORDER — GLYCOPYRROLATE 0.2 MG/ML
INJECTION, SOLUTION INTRAMUSCULAR; INTRAVENOUS
Status: DISPENSED
Start: 2024-08-14

## (undated) RX ORDER — LIDOCAINE HYDROCHLORIDE AND EPINEPHRINE 10; 10 MG/ML; UG/ML
INJECTION, SOLUTION INFILTRATION; PERINEURAL
Status: DISPENSED
Start: 2024-08-14